# Patient Record
Sex: MALE | Employment: OTHER | URBAN - METROPOLITAN AREA
[De-identification: names, ages, dates, MRNs, and addresses within clinical notes are randomized per-mention and may not be internally consistent; named-entity substitution may affect disease eponyms.]

---

## 2017-03-23 DIAGNOSIS — J30.2 SEASONAL ALLERGIES: Primary | ICD-10-CM

## 2017-03-23 RX ORDER — CETIRIZINE HYDROCHLORIDE 10 MG/1
10 TABLET ORAL EVERY EVENING
Qty: 30 TABLET | Refills: 3 | Status: SHIPPED | OUTPATIENT
Start: 2017-03-23 | End: 2017-08-08

## 2017-03-23 NOTE — TELEPHONE ENCOUNTER
Prescription approved per [facility] Refill Protocol.

## 2017-03-23 NOTE — TELEPHONE ENCOUNTER
cetirizine zyrtec 10mg      Last Written Prescription Date: 03/22/16  Last Fill Quantity: 30,  # refills: 8   Last Office Visit with [facility], [facility] or [facility] prescribing provider: 09/21/16

## 2017-04-20 DIAGNOSIS — H69.91 ACUTE DYSFUNCTION OF EUSTACHIAN TUBE, RIGHT: ICD-10-CM

## 2017-04-21 RX ORDER — FLUTICASONE PROPIONATE 50 MCG
SPRAY, SUSPENSION (ML) NASAL
Qty: 16 ML | Refills: 1 | Status: SHIPPED | OUTPATIENT
Start: 2017-04-21 | End: 2017-04-24

## 2017-04-21 NOTE — TELEPHONE ENCOUNTER
fluticasone       Last Written Prescription Date: 10/06/16  Last Fill Quantity: 1bottle, # refills: 0    Last Office Visit with [facility], P or [facility] prescribing provider:  09/21/16   Future Office Visit:  04/24/17  Next 5 appointments (look out 90 days)     Apr 24, 2017  7:00 AM CDT   PHYSICAL with Blake Jara MD   [facility]    [address] [place] [id number] [phone]                   Date of Last Asthma Action Plan Letter:   There are no preventive care reminders to display for this patient.   Asthma Control Test: No flowsheet data found.    Date of Last Spirometry Test:   No results found for this or any previous visit.

## 2017-04-21 NOTE — TELEPHONE ENCOUNTER
Prescription approved per [facility] Refill Protocol.

## 2017-04-24 ENCOUNTER — OFFICE VISIT (OUTPATIENT)
Dept: INTERNAL MEDICINE | Facility: CLINIC | Age: 37
End: 2017-04-24
Payer: COMMERCIAL

## 2017-04-24 VITALS
HEIGHT: 68 IN | DIASTOLIC BLOOD PRESSURE: 80 MMHG | HEART RATE: 65 BPM | BODY MASS INDEX: 29.36 KG/M2 | OXYGEN SATURATION: 100 % | WEIGHT: 193.7 LBS | TEMPERATURE: 97.7 F | SYSTOLIC BLOOD PRESSURE: 120 MMHG

## 2017-04-24 DIAGNOSIS — Z00.01 ENCOUNTER FOR ROUTINE ADULT MEDICAL EXAM WITH ABNORMAL FINDINGS: Primary | ICD-10-CM

## 2017-04-24 DIAGNOSIS — E78.1 HYPERTRIGLYCERIDEMIA: ICD-10-CM

## 2017-04-24 LAB
CHOLEST SERPL-MCNC: 183 MG/DL
GLUCOSE SERPL-MCNC: 97 MG/DL (ref 70–99)
HDLC SERPL-MCNC: 32 MG/DL
LDLC SERPL CALC-MCNC: ABNORMAL MG/DL
LDLC SERPL DIRECT ASSAY-MCNC: 94 MG/DL
NONHDLC SERPL-MCNC: 151 MG/DL
TRIGL SERPL-MCNC: 443 MG/DL

## 2017-04-24 PROCEDURE — 82947 ASSAY GLUCOSE BLOOD QUANT: CPT | Performed by: INTERNAL MEDICINE

## 2017-04-24 PROCEDURE — 36415 COLL VENOUS BLD VENIPUNCTURE: CPT | Performed by: INTERNAL MEDICINE

## 2017-04-24 PROCEDURE — 83721 ASSAY OF BLOOD LIPOPROTEIN: CPT | Mod: 59 | Performed by: INTERNAL MEDICINE

## 2017-04-24 PROCEDURE — 80061 LIPID PANEL: CPT | Performed by: INTERNAL MEDICINE

## 2017-04-24 PROCEDURE — 99395 PREV VISIT EST AGE 18-39: CPT | Performed by: INTERNAL MEDICINE

## 2017-04-24 NOTE — PROGRESS NOTES
SUBJECTIVE:     CC: Cesar Zhao is an 36 year old male who presents for preventative health visit.     Healthy Habits:    Do you get at least three servings of calcium containing foods daily (dairy, green leafy vegetables, etc.)? yes    Amount of exercise or daily activities, outside of work: 2 day(s) per week    Problems taking medications regularly No    Medication side effects: No    Have you had an eye exam in the past two years? yes    Do you see a dentist twice per year? yes  Do you have sleep apnea, excessive snoring or daytime drowsiness?no    Today's PHQ-2 Score:   PHQ-2 ( 1999 Pf[employer]) 4/24/2017 3/22/2016   Q1: Little interest or pleasure in doing things 0 0   Q2: Feeling down, depressed or hopeless 0 0   PHQ-2 Score 0 0       Abuse: Current or Past(Physical, Sexual or Emotional)- No  Do you feel safe in your environment - Yes    Social History   Substance Use Topics     Smoking status: Current Every Day Smoker     Packs/day: 0.10     Types: Cigarettes     Smokeless tobacco: Never Used     Alcohol use 0.0 oz/week     0 Standard drinks or equivalent per week      Comment: 6x per year     The patient does not drink >3 drinks per day nor >7 drinks per week.    Last PSA: No results found for: PSA    Recent Labs   Lab Test  03/22/16   0959  03/16/15   0911   12/05/13   0959   CHOL  173  141   --   164   HDL  33*  30*   --   32*   LDL  Cannot estimate LDL when triglyceride exceeds 400 mg/dL  79  37   --   84   TRIG  494*  369*   < >  245*   CHOLHDLRATIO   --   4.7   --   5.2*   NHDL  140*   --    --    --     < > = values in this interval not displayed.       Reviewed orders with patient. Reviewed health maintenance and updated orders accordingly - Yes    Reviewed and updated as needed this visit by clinical staff  Tobacco  Allergies  Soc Hx        Reviewed and updated as needed this visit by Provider  Tobacco  Soc Hx           ROS:  C: NEGATIVE for fever, chills, change in weight  I: NEGATIVE  "for worrisome rashes, moles or lesions  E: NEGATIVE for vision changes or irritation  ENT: NEGATIVE for ear, mouth and throat problems  R: NEGATIVE for significant cough or SOB  CV: NEGATIVE for chest pain, palpitations or peripheral edema  GI: NEGATIVE for nausea, abdominal pain, heartburn, or change in bowel habits   male: negative for dysuria, hematuria, decreased urinary stream, erectile dysfunction, urethral discharge  M: NEGATIVE for significant arthralgias or myalgia  N: NEGATIVE for weakness, dizziness or paresthesias  P: NEGATIVE for changes in mood or affect    Problem list, Medication list, Allergies, and Medical/Social/Surgical histories reviewed in [facility] and updated as appropriate.  OBJECTIVE:     /80  Pulse 65  Temp 97.7  F (36.5  C) (Oral)  Ht 5' 8\" (1.727 m)  Wt 193 lb 11.2 oz (87.9 kg)  SpO2 100%  BMI 29.45 kg/m2  EXAM:  GENERAL: healthy, alert and no distress  EYES: Eyes grossly normal to inspection, PERRL and conjunctivae and sclerae normal  HENT: ear canals and TM's normal, nose and mouth without ulcers or lesions  NECK: no adenopathy, no asymmetry, masses, or scars and thyroid normal to palpation  RESP: lungs clear to auscultation - no rales, rhonchi or wheezes  CV: regular rate and rhythm, normal S1 S2, no S3 or S4, no murmur, click or rub, no peripheral edema and peripheral pulses strong  ABDOMEN: soft, nontender, no hepatosplenomegaly, no masses and bowel sounds normal  MS: no gross musculoskeletal defects noted, no edema  SKIN: no suspicious lesions or rashes  NEURO: Normal strength and tone, mentation intact and speech normal  PSYCH: mentation appears normal, affect normal/bright  LYMPH: no cervical, supraclavicular, axillary, or inguinal adenopathy    Results for orders placed or performed in visit on 04/24/17   Lipid Profile with reflex to direct LDL   Result Value Ref Range    Cholesterol 183 <200 mg/dL    Triglycerides 443 (H) <150 mg/dL    HDL Cholesterol 32 (L) >39 mg/dL " "   LDL Cholesterol Calculated  <100 mg/dL     Cannot estimate LDL when triglyceride exceeds 400 mg/dL    Non HDL Cholesterol 151 (H) <130 mg/dL   Glucose   Result Value Ref Range    Glucose 97 70 - 99 mg/dL   LDL cholesterol direct   Result Value Ref Range    LDL Cholesterol Direct 94 <100 mg/dL      ASSESSMENT/PLAN:     1. Encounter for routine adult medical exam with abnormal findings  Overall in good health - follow dietary recommendations , exercise given elev TG      2. Hypertriglyceridemia, initial TG>1000  Continues- will have him try some omega fatty acids/Fish oil OTC  - Lipid Profile with reflex to direct LDL  - Glucose    COUNSELING:  Reviewed preventive health counseling, as reflected in patient instructions         reports that he has been smoking Cigarettes.  He has been smoking about 0.10 packs per day. He has never used smokeless tobacco.    Estimated body mass index is 29.45 kg/(m^2) as calculated from the following:    Height as of this encounter: 5' 8\" (1.727 m).    Weight as of this encounter: 193 lb 11.2 oz (87.9 kg).       Counseling Resources:  ATP IV Guidelines  Pooled Cohorts Equation Calculator  FRAX Risk Assessment  ICSI Preventive Guidelines  Dietary Guidelines for Americans, 2010  USDA's MyPlate  ASA Prophylaxis  Lung CA Screening    Blake Jara MD  [facility]  "

## 2017-04-24 NOTE — MR AVS SNAPSHOT
After Visit Summary   4/24/2017    Cesar Zhao    MRN: [id number]           Patient Information     Date Of Birth          1980        Visit Information        Provider Department      4/24/2017 7:00 AM Blake Jara MD [facility]        Today's Diagnoses     Encounter for routine adult medical exam with abnormal findings    -  1    Hypertriglyceridemia, initial TG>1000          Care Instructions      Preventive Health Recommendations  Male Ages 26 - 39    Yearly exam:             See your health care provider every year in order to  o   Review health changes.   o   Discuss preventive care.    o   Review your medicines if your doctor has prescribed any.    You should be tested each year for STDs (sexually transmitted diseases), if you re at risk.     After age 35, talk to your provider about cholesterol testing. If you are at risk for heart disease, have your cholesterol tested at least every 5 years.     If you are at risk for diabetes, you should have a diabetes test (fasting glucose).  Shots: Get a flu shot each year. Get a tetanus shot every 10 years.     Nutrition:    Eat at least 5 servings of fruits and vegetables daily.     Eat whole-grain bread, whole-wheat pasta and brown rice instead of white grains and rice.     Talk to your provider about Calcium and Vitamin D.     Lifestyle    Exercise for at least 150 minutes a week (30 minutes a day, 5 days a week). This will help you control your weight and prevent disease.     Limit alcohol to one drink per day.     No smoking.     Wear sunscreen to prevent skin cancer.     See your dentist every six months for an exam and cleaning.     The heart-healthy Mediterranean is a healthy eating plan based on typical foods and recipes of Mediterranean-style cooking. Here's how to adopt the Mediterranean diet.   If you're looking for a heart-healthy eating plan, the Mediterranean diet might be right for you. The  Mediterranean diet incorporates the basics of healthy eating -- plus a splash of flavorful olive oil and perhaps even a glass of red wine -- among other components characterizing the traditional cooking style of countries bordering the [place].  Most healthy diets include fruits, vegetables, fish and whole grains, and limit unhealthy fats. While these parts of a healthy diet remain tried-and-true, subtle variations or differences in proportions of certain foods may make a difference in your risk of heart disease.  Research has shown that the traditional Mediterranean diet reduces the risk of heart disease. In fact, an analysis of more than 1.5 million healthy adults demonstrated that following a Mediterranean diet was associated with a reduced risk of death from heart disease and cancer, as well as a reduced incidence of Parkinson's and Alzheimer's diseases.   The Dietary Guidelines for Americans recommends the Mediterranean diet as an eating plan that can help promote health and prevent disease. And the Mediterranean diet is one your whole family can follow for good health.   The Mediterranean diet emphasizes:  Eating primarily plant-based foods, such as fruits and vegetables, whole grains, legumes and nuts   Replacing butter with healthy fats, such as olive oil   Using herbs and spices instead of salt to flavor foods   Limiting red meat to no more than a few times a month   Eating fish and poultry at least twice a week   Drinking red wine in moderation (optional)  The diet also recognizes the importance of being physically active, and enjoying meals with family and friends.  The Mediterranean diet traditionally includes fruits, vegetables and grains. For example, residents of [facility] average six or more servings a day of antioxidant-rich fruits and vegetables.  Grains in the Mediterranean region are typically whole grain and usually contain very few unhealthy trans fats, and bread is an important part of  "the diet. However, throughout the Mediterranean region, bread is eaten plain or dipped in olive oil -- not eaten with butter or margarine, which contains saturated or trans fats.   Nuts are another part of a healthy Mediterranean diet. Nuts are high in fat, but most of the fat is healthy. Because nuts are high in calories, they should not be eaten in large amounts -- generally no more than a handful a day. For the best nutrition, avoid candied or honey-roasted and heavily salted nuts.  The focus of the Mediterranean diet isn't on limiting total fat consumption, but rather on choosing healthier types of fat. The Mediterranean diet discourages saturated fats and hydrogenated oils (trans fats), both of which contribute to heart disease.  The Mediterranean diet features olive oil as the primary source of fat. Olive oil is mainly monounsaturated fat -- a type of fat that can help reduce low-density lipoprotein (LDL) cholesterol levels when used in place of saturated or trans fats. \"Extra-virgin\" and \"virgin\" olive oils (the least processed forms) also contain the highest levels of protective plant compounds that provide antioxidant effects.  Canola oil and some nuts contain the beneficial linolenic acid (a type of omega-3 fatty acid) in addition to healthy unsaturated fat. Omega-3 fatty acids lower triglycerides, decrease blood clotting, and are associated with decreased incidence of sudden heart attacks, improve the health of your blood vessels, and help moderate blood pressure. Fatty fish -- such as mackerel, lake trout, herring, sardines, albacore tuna and salmon -- are rich sources of omega-3 fatty acids. Fish is eaten on a regular basis in the Mediterranean diet.  The health effects of alcohol have been debated for many years, and some doctors are reluctant to encourage alcohol consumption because of the health consequences of excessive drinking. However, alcohol -- in moderation -- has been associated with a " reduced risk of heart disease in some research studies.  The Mediterranean diet typically includes a moderate amount of wine, usually red wine. This means no more than 5 ounces (148 milliliters) of wine daily for women of all ages and men older than age 65 and no more than 10 ounces (296 milliliters) of wine daily for younger men. More than this may increase the risk of health problems, including increased risk of certain types of cancer.   If you're unable to limit your alcohol intake to the amounts defined above, if you have a personal or family history of alcohol abuse, or if you have heart or liver disease, refrain from drinking wine or any other alcohol.  The Mediterranean diet is a delicious and healthy way to eat. Many people who switch to this style of eating say they'll never eat any other way. Here are some specific steps to get you started:   Eat your veggies and fruits -- and switch to whole grains. [place] of plant foods should make up the majority of your meals. They should be minimally processed -- fresh and whole are best. Include veggies and fruits in every meal and eat them for snacks as well. Switch to whole-grain bread and cereal, and begin to eat more whole-grain rice and pasta products. Keep baby carrots, apples and bananas on hand for quick, satisfying snacks. Fruit salads are a wonderful way to eat a variety of healthy fruit.   Go nuts. Nuts and seeds are good sources of fiber, protein and healthy fats. Keep almonds, cashews, pistachios and walnuts on hand for a quick snack. Choose natural peanut butter, rather than the kind with hydrogenated fat added. Try blended sesame seeds (tahini) as a dip or spread for bread.   Pass on the butter. Try olive or canola oil as a healthy replacement for butter or margarine. Lightly drizzle it over vegetables. After cooking pasta, add a touch of olive oil, some garlic and green onions for flavoring. Dip bread in flavored olive oil or lightly spread it on  whole-grain bread for a tasty alternative to butter. Try tahini as a dip or spread for bread too.   Spice it up. Herbs and spices make food tasty and can  for salt and fat in recipes.   Go fish. Eat fish at least twice a week. Fresh or water-packed tuna, salmon, trout, mackerel and herring are healthy choices. [place], bake or broil fish for great taste and easy cleanup. Avoid breaded and fried fish.   Rein in the red meat. Limit red meat to no more than a few times a month. Substitute fish and poultry for red meat. When choosing red meat, make sure it's lean and keep portions small (about the size of a deck of cards). Also avoid sausage, goldstein and other high-fat, processed meats.   Choose low-fat dairy. Limit higher fat dairy products, such as whole or 2 percent milk, cheese and ice cream. Switch to skim milk, fat-free yogurt and low-fat cheese          Follow-ups after your visit        Who to contact     If you have questions or need follow up information about today's clinic visit or your schedule please contact [facility] directly at [phone].  Normal or non-critical lab and imaging results will be communicated to you by Diseniahart, letter or phone within 4 business days after the clinic has received the results. If you do not hear from us within 7 days, please contact the clinic through SenSaget or phone. If you have a critical or abnormal lab result, we will notify you by phone as soon as possible.  Submit refill requests through InVisioneer or call your pharmacy and they will forward the refill request to us. Please allow 3 business days for your refill to be completed.          Additional Information About Your Visit        InVisioneer Information     In[employer] gives you secure access to your electronic health record. If you see a primary care provider, you can also send messages to your care team and make appointments. If you have questions, please call your primary care clinic.  If  "you do not have a primary care provider, please call [phone] and they will assist you.        Care EveryWhere ID     This is your Care EveryWhere ID. This could be used by other organizations to access your [facility] medical records  [id number]        Your Vitals Were     Pulse Temperature Height Pulse Oximetry BMI (Body Mass Index)       65 97.7  F (36.5  C) (Oral) 5' 8\" (1.727 m) 100% 29.45 kg/m2        Blood Pressure from Last 3 Encounters:   04/24/17 120/80   11/04/16 122/70   10/06/16 112/70    Weight from Last 3 Encounters:   04/24/17 193 lb 11.2 oz (87.9 kg)   11/04/16 193 lb 4.8 oz (87.7 kg)   10/06/16 193 lb 14.4 oz (88 kg)              We Performed the Following     Glucose     Lipid Profile with reflex to direct LDL        Primary Care Provider Office Phone # Fax #    Blake Jara [phone]       [facility] [address] [place] [address]        Thank you!     Thank you for choosing [facility]  for your care. Our goal is always to provide you with excellent care. Hearing back from our patients is one way we can continue to improve our services. Please take a few minutes to complete the written survey that you may receive in the mail after your visit with us. Thank you!             Your Updated Medication List - Protect others around you: Learn how to safely use, store and throw away your medicines at www.disposemymeds.org.          This list is accurate as of: 4/24/17  7:44 AM.  Always use your most recent med list.                   Brand Name Dispense Instructions for use    cetirizine 10 MG tablet    zyrTEC    30 tablet    Take 1 tablet (10 mg) by mouth every evening During allergy season       fluticasone 50 MCG/ACT spray    FLONASE    16 g    Spray 2 sprays into both nostrils daily USE ONCE PER DAY DURING THE ALLERGY SEASON       terbinafine 250 MG tablet    lamISIL     Take 250 mg by mouth daily Reported on 4/24/2017       " valACYclovir 500 MG tablet    VALTREX    10 tablet    Take 1 tablet (500 mg) by mouth 2 times daily

## 2017-04-24 NOTE — PATIENT INSTRUCTIONS
Preventive Health Recommendations  Male Ages 26 - 39    Yearly exam:             See your health care provider every year in order to  o   Review health changes.   o   Discuss preventive care.    o   Review your medicines if your doctor has prescribed any.    You should be tested each year for STDs (sexually transmitted diseases), if you re at risk.     After age 35, talk to your provider about cholesterol testing. If you are at risk for heart disease, have your cholesterol tested at least every 5 years.     If you are at risk for diabetes, you should have a diabetes test (fasting glucose).  Shots: Get a flu shot each year. Get a tetanus shot every 10 years.     Nutrition:    Eat at least 5 servings of fruits and vegetables daily.     Eat whole-grain bread, whole-wheat pasta and brown rice instead of white grains and rice.     Talk to your provider about Calcium and Vitamin D.     Lifestyle    Exercise for at least 150 minutes a week (30 minutes a day, 5 days a week). This will help you control your weight and prevent disease.     Limit alcohol to one drink per day.     No smoking.     Wear sunscreen to prevent skin cancer.     See your dentist every six months for an exam and cleaning.     The heart-healthy Mediterranean is a healthy eating plan based on typical foods and recipes of Mediterranean-style cooking. Here's how to adopt the Mediterranean diet.   If you're looking for a heart-healthy eating plan, the Mediterranean diet might be right for you. The Mediterranean diet incorporates the basics of healthy eating -- plus a splash of flavorful olive oil and perhaps even a glass of red wine -- among other components characterizing the traditional cooking style of countries bordering the [place].  Most healthy diets include fruits, vegetables, fish and whole grains, and limit unhealthy fats. While these parts of a healthy diet remain tried-and-true, subtle variations or differences in proportions of  certain foods may make a difference in your risk of heart disease.  Research has shown that the traditional Mediterranean diet reduces the risk of heart disease. In fact, an analysis of more than 1.5 million healthy adults demonstrated that following a Mediterranean diet was associated with a reduced risk of death from heart disease and cancer, as well as a reduced incidence of Parkinson's and Alzheimer's diseases.   The Dietary Guidelines for Americans recommends the Mediterranean diet as an eating plan that can help promote health and prevent disease. And the Mediterranean diet is one your whole family can follow for good health.   The Mediterranean diet emphasizes:  Eating primarily plant-based foods, such as fruits and vegetables, whole grains, legumes and nuts   Replacing butter with healthy fats, such as olive oil   Using herbs and spices instead of salt to flavor foods   Limiting red meat to no more than a few times a month   Eating fish and poultry at least twice a week   Drinking red wine in moderation (optional)  The diet also recognizes the importance of being physically active, and enjoying meals with family and friends.  The Mediterranean diet traditionally includes fruits, vegetables and grains. For example, residents of [facility] average six or more servings a day of antioxidant-rich fruits and vegetables.  Grains in the Mediterranean region are typically whole grain and usually contain very few unhealthy trans fats, and bread is an important part of the diet. However, throughout the Mediterranean region, bread is eaten plain or dipped in olive oil -- not eaten with butter or margarine, which contains saturated or trans fats.   Nuts are another part of a healthy Mediterranean diet. Nuts are high in fat, but most of the fat is healthy. Because nuts are high in calories, they should not be eaten in large amounts -- generally no more than a handful a day. For the best nutrition, avoid candied or  "honey-roasted and heavily salted nuts.  The focus of the Mediterranean diet isn't on limiting total fat consumption, but rather on choosing healthier types of fat. The Mediterranean diet discourages saturated fats and hydrogenated oils (trans fats), both of which contribute to heart disease.  The Mediterranean diet features olive oil as the primary source of fat. Olive oil is mainly monounsaturated fat -- a type of fat that can help reduce low-density lipoprotein (LDL) cholesterol levels when used in place of saturated or trans fats. \"Extra-virgin\" and \"virgin\" olive oils (the least processed forms) also contain the highest levels of protective plant compounds that provide antioxidant effects.  Canola oil and some nuts contain the beneficial linolenic acid (a type of omega-3 fatty acid) in addition to healthy unsaturated fat. Omega-3 fatty acids lower triglycerides, decrease blood clotting, and are associated with decreased incidence of sudden heart attacks, improve the health of your blood vessels, and help moderate blood pressure. Fatty fish -- such as mackerel, lake trout, herring, sardines, albacore tuna and salmon -- are rich sources of omega-3 fatty acids. Fish is eaten on a regular basis in the Mediterranean diet.  The health effects of alcohol have been debated for many years, and some doctors are reluctant to encourage alcohol consumption because of the health consequences of excessive drinking. However, alcohol -- in moderation -- has been associated with a reduced risk of heart disease in some research studies.  The Mediterranean diet typically includes a moderate amount of wine, usually red wine. This means no more than 5 ounces (148 milliliters) of wine daily for women of all ages and men older than age 65 and no more than 10 ounces (296 milliliters) of wine daily for younger men. More than this may increase the risk of health problems, including increased risk of certain types of cancer.   If you're " unable to limit your alcohol intake to the amounts defined above, if you have a personal or family history of alcohol abuse, or if you have heart or liver disease, refrain from drinking wine or any other alcohol.  The Mediterranean diet is a delicious and healthy way to eat. Many people who switch to this style of eating say they'll never eat any other way. Here are some specific steps to get you started:   Eat your veggies and fruits -- and switch to whole grains. Av[place]ety of plant foods should make up the majority of your meals. They should be minimally processed -- fresh and whole are best. Include veggies and fruits in every meal and eat them for snacks as well. Switch to whole-grain bread and cereal, and begin to eat more whole-grain rice and pasta products. Keep baby carrots, apples and bananas on hand for quick, satisfying snacks. Fruit salads are a wonderful way to eat a variety of healthy fruit.   Go nuts. Nuts and seeds are good sources of fiber, protein and healthy fats. Keep almonds, cashews, pistachios and walnuts on hand for a quick snack. Choose natural peanut butter, rather than the kind with hydrogenated fat added. Try blended sesame seeds (tahini) as a dip or spread for bread.   Pass on the butter. Try olive or canola oil as a healthy replacement for butter or margarine. Lightly drizzle it over vegetables. After cooking pasta, add a touch of olive oil, some garlic and green onions for flavoring. Dip bread in flavored olive oil or lightly spread it on whole-grain bread for a tasty alternative to butter. Try tahini as a dip or spread for bread too.   Spice it up. Herbs and spices make food tasty and can  for salt and fat in recipes.   Go fish. Eat fish at least twice a week. Fresh or water-packed tuna, salmon, trout, mackerel and herring are healthy choices. [place], bake or broil fish for great taste and easy cleanup. Avoid breaded and fried fish.   Rein in the red meat. Limit red meat to no  more than a few times a month. Substitute fish and poultry for red meat. When choosing red meat, make sure it's lean and keep portions small (about the size of a deck of cards). Also avoid sausage, goldstein and other high-fat, processed meats.   Choose low-fat dairy. Limit higher fat dairy products, such as whole or 2 percent milk, cheese and ice cream. Switch to skim milk, fat-free yogurt and low-fat cheese

## 2017-04-24 NOTE — NURSING NOTE
"Chief Complaint   Patient presents with     Physical       Initial /80  Pulse 65  Temp 97.7  F (36.5  C) (Oral)  Ht 5' 8\" (1.727 m)  Wt 193 lb 11.2 oz (87.9 kg)  SpO2 100%  BMI 29.45 kg/m2 Estimated body mass index is 29.45 kg/(m^2) as calculated from the following:    Height as of this encounter: 5' 8\" (1.727 m).    Weight as of this encounter: 193 lb 11.2 oz (87.9 kg).  Medication Reconciliation: complete    "

## 2017-07-25 ENCOUNTER — OFFICE VISIT (OUTPATIENT)
Dept: INTERNAL MEDICINE | Facility: CLINIC | Age: 37
End: 2017-07-25
Payer: COMMERCIAL

## 2017-07-25 VITALS
BODY MASS INDEX: 28.8 KG/M2 | HEART RATE: 62 BPM | DIASTOLIC BLOOD PRESSURE: 66 MMHG | WEIGHT: 189.4 LBS | OXYGEN SATURATION: 97 % | SYSTOLIC BLOOD PRESSURE: 124 MMHG | TEMPERATURE: 98.2 F

## 2017-07-25 DIAGNOSIS — N45.1 EPIDIDYMITIS, RIGHT: Primary | ICD-10-CM

## 2017-07-25 PROCEDURE — 99213 OFFICE O/P EST LOW 20 MIN: CPT | Performed by: INTERNAL MEDICINE

## 2017-07-25 RX ORDER — LEVOFLOXACIN 500 MG/1
500 TABLET, FILM COATED ORAL DAILY
Qty: 10 TABLET | Refills: 0 | Status: SHIPPED | OUTPATIENT
Start: 2017-07-25 | End: 2017-08-04

## 2017-07-25 NOTE — MR AVS SNAPSHOT
After Visit Summary   7/25/2017    Cesar Zhao    MRN: [id number]           Patient Information     Date Of Birth          1980        Visit Information        Provider Department      7/25/2017 3:20 PM Tristan Herrera MD [facility]        Today's Diagnoses     Epididymitis, right    -  1      Care Instructions    You have a bacterial infection of the Epididymis.  his condition will produce lower abdominal and/or pelvic pain, low back pain, urinary frequency, urinary urgency, small amount of blood in the urine or semen.      The most common antibiotic we use is called Levofloxacin 500 mg once per day for 10 days.  This will work most of the time.     The most common side effects from Levofloxacin include diarrhea, stomach upset, and possible tendonitis or rear cases of tendon rupture.  If you develop any side effects, stop the medication and call our clinic nurse line at [phone].        *  take over the counter Motrin/Ibuprofen/Advil or Aleve to help relieve pain and inflammation.  follow the directions on the bottle.  Be sure to take with a small meal to prevent stomach upset.      --Motrin 600 mg ( 3 x 200 mg tablets) three times per day every day for 5-7 days, then 2-3 timers per day as needed (take with food to avoid stomach side effects)     OR     --Aleve 2 tablets twice per day for 5-7 days every day, then twice per day as needed *  moist heat as needed ( do not use a heating pad for longer than 20 minutes to lower the risk of burns)            Follow-ups after your visit        Who to contact     If you have questions or need follow up information about today's clinic visit or your schedule please contact [facility] directly at [phone].  Normal or non-critical lab and imaging results will be communicated to you by MyChart, letter or phone within 4 business days after the clinic has received the results. If  you do not hear from us within 7 days, please contact the clinic through [employer] or phone. If you have a critical or abnormal lab result, we will notify you by phone as soon as possible.  Submit refill requests through [employer] or call your pharmacy and they will forward the refill request to us. Please allow 3 business days for your refill to be completed.          Additional Information About Your Visit        [employer] Information     [employer] gives you secure access to your electronic health record. If you see a primary care provider, you can also send messages to your care team and make appointments. If you have questions, please call your primary care clinic.  If you do not have a primary care provider, please call [phone] and they will assist you.        Care EveryWhere ID     This is your Care EveryWhere ID. This could be used by other organizations to access your [facility] medical records  [id number]        Your Vitals Were     Pulse Temperature Pulse Oximetry BMI (Body Mass Index)          62 98.2  F (36.8  C) (Oral) 97% 28.8 kg/m2         Blood Pressure from Last 3 Encounters:   07/25/17 124/66   04/24/17 120/80   11/04/16 122/70    Weight from Last 3 Encounters:   07/25/17 189 lb 6.4 oz (85.9 kg)   04/24/17 193 lb 11.2 oz (87.9 kg)   11/04/16 193 lb 4.8 oz (87.7 kg)              Today, you had the following     No orders found for display         Today's Medication Changes          These changes are accurate as of: 7/25/17  4:18 PM.  If you have any questions, ask your nurse or doctor.               Start taking these medicines.        Dose/Directions    levofloxacin 500 MG tablet   Commonly known as:  LEVAQUIN   Used for:  Epididymitis, right   Started by:  Tristan Herrera MD        Dose:  500 mg   Take 1 tablet (500 mg) by mouth daily for 10 days   Quantity:  10 tablet   Refills:  0            Where to get your medicines      These medications were sent to [facility] [id number] [place], [place]  - [address]  [address], [facility] [address]     Phone:  [phone]     levofloxacin 500 MG tablet                Primary Care Provider Office Phone # Fax #    Blake Jara [phone]       [facility] [address]  [facility] [address]        Equal Access to Services     JOSH LEUNG : Hadii aad ku hadasho Soomaali, waaxda luqadaha, qaybta kaalmada adeegyada, waxay idiin hayaan adeeg kharash la'aan ah. So [facility] [phone].    ATENCIÓN: Si habla espjovana, tiene a perez disposición servicios gratuitos de asistencia lingüística. Ileana al [phone].    We comply with applicable federal civil rights laws and [place] laws. We do not discriminate on the basis of race, color, national origin, age, disability sex, sexual orientation or gender identity.            Thank you!     Thank you for choosing [facility]  for your care. Our goal is always to provide you with excellent care. Hearing back from our patients is one way we can continue to improve our services. Please take a few minutes to complete the written survey that you may receive in the mail after your visit with us. Thank you!             Your Updated Medication List - Protect others around you: Learn how to safely use, store and throw away your medicines at www.disposemymeds.org.          This list is accurate as of: 7/25/17  4:18 PM.  Always use your most recent med list.                   Brand Name Dispense Instructions for use Diagnosis    cetirizine 10 MG tablet    zyrTEC    30 tablet    Take 1 tablet (10 mg) by mouth every evening During allergy season    Seasonal allergies       fluticasone 50 MCG/ACT spray    FLONASE    16 g    Spray 2 sprays into both nostrils daily USE ONCE PER DAY DURING THE ALLERGY SEASON    Allergic rhinitis due to pollen       levofloxacin 500 MG tablet    LEVAQUIN    10 tablet    Take 1 tablet (500 mg) by mouth daily for 10 days    Epididymitis,  right

## 2017-07-25 NOTE — PROGRESS NOTES
SUBJECTIVE:                                                    Cesar Zhao is a 36 year old male who presents to clinic today for the following health issues:      ABDOMINAL   PAIN     Onset: 4 days    Description:   Character: Dull ache  Location: right lower quadrant and R testicle  Radiation: None    Intensity: moderate    Progression of Symptoms:  same    Accompanying Signs & Symptoms:  Fever/Chills?: no   Gas/Bloating: YES  Nausea: YES  Vomitting: no   Diarrhea?: no   Constipation:no   Dysuria or Hematuria: no     Pain is worse when pt is active/moving around   History:   Trauma: no   Previous similar pain: YES- same pain occurred about 12 yrs ago, they said it was due to activity in sport and was told to wear more form fitting underwear   Previous tests done: none    Precipitating factors:   Does the pain change with:     Food: no      BM: no     Urination: no     Alleviating factors:  inactivity    Therapies Tried and outcome: nothing    LMP:  not applicable             Problem list and histories reviewed & adjusted, as indicated.  Additional history: as documented        Reviewed and updated as needed this visit by clinical staffTobacco  Allergies       Reviewed and updated as needed this visit by Provider           Past Medical History:  ---------------------------  Past Medical History:   Diagnosis Date     Hyperlipidemia LDL goal < 130     elevated triglycerides     Hypertriglyceridemia        Past Surgical History:  ---------------------------  No past surgical history on file.    Current Medications:  ---------------------------  Current Outpatient Prescriptions   Medication Sig Dispense Refill     cetirizine (ZYRTEC) 10 MG tablet Take 1 tablet (10 mg) by mouth every evening During allergy season 30 tablet 3     fluticasone (FLONASE) 50 MCG/ACT nasal spray Spray 2 sprays into both nostrils daily USE ONCE PER DAY DURING THE ALLERGY SEASON 16 g 11       Allergies:  -------------  Allergies    Allergen Reactions     No Known Drug Allergies        Social History:  -------------------  Social History     Social History     Marital status:      Spouse name: N/A     Number of children: 1     Years of education: N/A     Occupational History     landscaping Self     Social History Main Topics     Smoking status: Current Every Day Smoker     Packs/day: 0.10     Types: Cigarettes     Smokeless tobacco: Never Used     Alcohol use 0.0 oz/week     0 Standard drinks or equivalent per week      Comment: 6x per year     Drug use: No     Sexual activity: Yes     Partners: Female     Other Topics Concern     Not on file     Social History Narrative       Family Medical History:  ------------------------------  Family History   Problem Relation Age of Onset     Hypertension Mother      Asthma Mother      resolved     Allergies Mother      seasonal allergies         ROS:  REVIEW OF SYSTEMS:    RESP: negative for cough, dyspnea, wheezing, hemoptysis  CV: negative for chest pain, palpitations, PND, CHAHAL, orthopnea; reports no changes in their ability to perform physical activity (from cardiovascular standpoint)  GI: negative for dysphagia, N/V, pain, melena, diarrhea and constipation  NEURO: negative for numbness/tingling, paralysis, incoordination, or focal weakness     OBJECTIVE:                                                    /66  Pulse 62  Temp 98.2  F (36.8  C) (Oral)  Wt 189 lb 6.4 oz (85.9 kg)  SpO2 97%  BMI 28.8 kg/m2     GENERAL alert and no distress  EYES:  Normal sclera,conjunctiva, EOMI  HENT: oral and posterior pharynx without lesions or erythema, facies symmetric  NECK: Neck supple. No LAD, without thyroidmegaly or JVD., Carotids without bruits.  RESP: Clear to ausculation bilaterally without wheezes or crackles. Normal BS in all fields.  CV: RRR normal S1S2 without murmurs, rubs or gallops. PMI normal  LYMPH: no cervical lymph adenopathy appreciated  MS: extremities- no gross deformities  of the visible extremities noted, no edema  PSYCH: Alert and oriented times 3; speech- coherent  SKIN:  No obvious significant skin lesions on visible portions of face   :  discomfrot to direct palpatio of the right epididymis, reproduces his pain nearly exactly.   Testicles were not painful, negative Prenz sign         ASSESSMENT/PLAN:                                                        (N45.1) Epididymitis, right  (primary encounter diagnosis)  Comment:   Plan: levofloxacin (LEVAQUIN) 500 MG tablet               You have a bacterial infection of the Epididymis.  his condition will produce lower abdominal and/or pelvic pain, low back pain, urinary frequency, urinary urgency, small amount of blood in the urine or semen.      The most common antibiotic we use is called Levofloxacin 500 mg once per day for 10 days.  This will work most of the time.     The most common side effects from Levofloxacin include diarrhea, stomach upset, and possible tendonitis or rear cases of tendon rupture.  If you develop any side effects, stop the medication and call our clinic nurse line at [phone].        *  take over the counter Motrin/Ibuprofen/Advil or Aleve to help relieve pain and inflammation.  follow the directions on the bottle.  Be sure to take with a small meal to prevent stomach upset.      --Motrin 600 mg ( 3 x 200 mg tablets) three times per day every day for 5-7 days, then 2-3 timers per day as needed (take with food to avoid stomach side effects)     OR     --Aleve 2 tablets twice per day for 5-7 days every day, then twice per day as needed *  moist heat as needed ( do not use a heating pad for longer than 20 minutes to lower the risk of burns)      See Patient Instructions    CASSANDRA ALEJANDRA M.D., MD  [facility]

## 2017-07-25 NOTE — PATIENT INSTRUCTIONS
You have a bacterial infection of the Epididymis.  his condition will produce lower abdominal and/or pelvic pain, low back pain, urinary frequency, urinary urgency, small amount of blood in the urine or semen.      The most common antibiotic we use is called Levofloxacin 500 mg once per day for 10 days.  This will work most of the time.     The most common side effects from Levofloxacin include diarrhea, stomach upset, and possible tendonitis or rear cases of tendon rupture.  If you develop any side effects, stop the medication and call our clinic nurse line at [phone].        *  take over the counter Motrin/Ibuprofen/Advil or Aleve to help relieve pain and inflammation.  follow the directions on the bottle.  Be sure to take with a small meal to prevent stomach upset.      --Motrin 600 mg ( 3 x 200 mg tablets) three times per day every day for 5-7 days, then 2-3 timers per day as needed (take with food to avoid stomach side effects)     OR     --Aleve 2 tablets twice per day for 5-7 days every day, then twice per day as needed *  moist heat as needed ( do not use a heating pad for longer than 20 minutes to lower the risk of burns)

## 2017-07-25 NOTE — NURSING NOTE
"Chief Complaint   Patient presents with     Abdominal Pain     pain in R groin/abd X 4 days       Initial /66  Pulse 62  Temp 98.2  F (36.8  C) (Oral)  Wt 189 lb 6.4 oz (85.9 kg)  SpO2 97%  BMI 28.8 kg/m2 Estimated body mass index is 28.8 kg/(m^2) as calculated from the following:    Height as of 4/24/17: 5' 8\" (1.727 m).    Weight as of this encounter: 189 lb 6.4 oz (85.9 kg).  Medication Reconciliation: complete   Cary Samano CMA      "

## 2017-08-08 DIAGNOSIS — J30.2 SEASONAL ALLERGIES: ICD-10-CM

## 2017-08-08 RX ORDER — CETIRIZINE HYDROCHLORIDE 10 MG/1
TABLET ORAL
Qty: 30 TABLET | Refills: 1 | Status: SHIPPED | OUTPATIENT
Start: 2017-08-08 | End: 2018-09-25

## 2017-08-08 NOTE — TELEPHONE ENCOUNTER
Prescription approved per [facility] Refill Protocol.      cetirizine (ZYRTEC) 10 MG tablet      Last Written Prescription Date: 3/23/17  Last Fill Quantity: 30,  # refills: 3   Last Office Visit with [facility], [facility] or [facility] prescribing provider: 7/25/17

## 2017-09-01 DIAGNOSIS — H69.91 ACUTE DYSFUNCTION OF EUSTACHIAN TUBE, RIGHT: ICD-10-CM

## 2017-09-01 DIAGNOSIS — J30.2 SEASONAL ALLERGIES: ICD-10-CM

## 2017-09-03 NOTE — TELEPHONE ENCOUNTER
cetirizine (ZYRTEC) 10 MG tablet      Last Written Prescription Date: 8/8/17  Last Fill Quantity: 30,  # refills: 1   [place] Office Visit with [facility] or [facility] prescribing provider: 7/25/17                                               fluticasone (FLONASE) 50 MCG/ACT nasal spray      Last Written Prescription Date: 3/22/16  Last Fill Quantity: 16G,  # refills: 11   Last [place] Visit [place] [facility] or [facility] prescribing provider: 7/25/17

## 2017-09-05 RX ORDER — FLUTICASONE PROPIONATE 50 MCG
SPRAY, SUSPENSION (ML) NASAL
Qty: 16 ML | Refills: 1 | Status: SHIPPED | OUTPATIENT
Start: 2017-09-05 | End: 2017-09-29

## 2017-09-05 RX ORDER — CETIRIZINE HYDROCHLORIDE 10 MG/1
TABLET ORAL
Qty: 30 TABLET | Refills: 1 | Status: SHIPPED | OUTPATIENT
Start: 2017-09-05 | End: 2017-09-29

## 2017-09-29 ENCOUNTER — OFFICE VISIT (OUTPATIENT)
Dept: INTERNAL MEDICINE | Facility: CLINIC | Age: 37
End: 2017-09-29
Payer: COMMERCIAL

## 2017-09-29 VITALS
WEIGHT: 195.2 LBS | TEMPERATURE: 98.7 F | HEART RATE: 67 BPM | BODY MASS INDEX: 29.68 KG/M2 | DIASTOLIC BLOOD PRESSURE: 76 MMHG | OXYGEN SATURATION: 96 % | SYSTOLIC BLOOD PRESSURE: 112 MMHG

## 2017-09-29 DIAGNOSIS — R42 LIGHTHEADEDNESS: Primary | ICD-10-CM

## 2017-09-29 DIAGNOSIS — F43.9 STRESS: ICD-10-CM

## 2017-09-29 PROCEDURE — 99213 OFFICE O/P EST LOW 20 MIN: CPT | Performed by: INTERNAL MEDICINE

## 2017-09-29 RX ORDER — CETIRIZINE HYDROCHLORIDE 10 MG/1
TABLET ORAL
Qty: 30 TABLET | Refills: 1 | Status: CANCELLED | OUTPATIENT
Start: 2017-09-29
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
## 2017-09-29 NOTE — NURSING NOTE
"Chief Complaint   Patient presents with     Dizziness       Initial /76  Pulse 67  Temp 98.7  F (37.1  C) (Oral)  Wt 195 lb 3.2 oz (88.5 kg)  SpO2 96%  BMI 29.68 kg/m2 Estimated body mass index is 29.68 kg/(m^2) as calculated from the following:    Height as of 4/24/17: 5' 8\" (1.727 m).    Weight as of this encounter: 195 lb 3.2 oz (88.5 kg).  Medication Reconciliation: complete    "

## 2017-09-29 NOTE — PATIENT INSTRUCTIONS
The heart-healthy Mediterranean is a healthy eating plan based on typical foods and recipes of Mediterranean-style cooking. Here's how to adopt the Mediterranean diet.   If you're looking for a heart-healthy eating plan, the Mediterranean diet might be right for you. The Mediterranean diet incorporates the basics of healthy eating -- plus a splash of flavorful olive oil and perhaps even a glass of red wine -- among other components characterizing the traditional cooking style of countries bordering the [place].  Most healthy diets include fruits, vegetables, fish and whole grains, and limit unhealthy fats. While these parts of a healthy diet remain tried-and-true, subtle variations or differences in proportions of certain foods may make a difference in your risk of heart disease.  Research has shown that the traditional Mediterranean diet reduces the risk of heart disease. In fact, an analysis of more than 1.5 million healthy adults demonstrated that following a Mediterranean diet was associated with a reduced risk of death from heart disease and cancer, as well as a reduced incidence of Parkinson's and Alzheimer's diseases.   The Dietary Guidelines for Americans recommends the Mediterranean diet as an eating plan that can help promote health and prevent disease. And the Mediterranean diet is one your whole family can follow for good health.   The Mediterranean diet emphasizes:  Eating primarily plant-based foods, such as fruits and vegetables, whole grains, legumes and nuts   Replacing butter with healthy fats, such as olive oil   Using herbs and spices instead of salt to flavor foods   Limiting red meat to no more than a few times a month   Eating fish and poultry at least twice a week   Drinking red wine in moderation (optional)  The diet also recognizes the importance of being physically active, and enjoying meals with family and friends.  The Mediterranean diet traditionally includes fruits,  "vegetables and grains. For example, residents of [facility] average six or more servings a day of antioxidant-rich fruits and vegetables.  Grains in the Mediterranean region are typically whole grain and usually contain very few unhealthy trans fats, and bread is an important part of the diet. However, throughout the Mediterranean region, bread is eaten plain or dipped in olive oil -- not eaten with butter or margarine, which contains saturated or trans fats.   Nuts are another part of a healthy Mediterranean diet. Nuts are high in fat, but most of the fat is healthy. Because nuts are high in calories, they should not be eaten in large amounts -- generally no more than a handful a day. For the best nutrition, avoid candied or honey-roasted and heavily salted nuts.  The focus of the Mediterranean diet isn't on limiting total fat consumption, but rather on choosing healthier types of fat. The Mediterranean diet discourages saturated fats and hydrogenated oils (trans fats), both of which contribute to heart disease.  The Mediterranean diet features olive oil as the primary source of fat. Olive oil is mainly monounsaturated fat -- a type of fat that can help reduce low-density lipoprotein (LDL) cholesterol levels when used in place of saturated or trans fats. \"Extra-virgin\" and \"virgin\" olive oils (the least processed forms) also contain the highest levels of protective plant compounds that provide antioxidant effects.  Canola oil and some nuts contain the beneficial linolenic acid (a type of omega-3 fatty acid) in addition to healthy unsaturated fat. Omega-3 fatty acids lower triglycerides, decrease blood clotting, and are associated with decreased incidence of sudden heart attacks, improve the health of your blood vessels, and help moderate blood pressure. Fatty fish -- such as mackerel, lake trout, herring, sardines, albacore tuna and salmon -- are rich sources of omega-3 fatty acids. Fish is eaten on a regular basis in " the Mediterranean diet.  The health effects of alcohol have been debated for many years, and some doctors are reluctant to encourage alcohol consumption because of the health consequences of excessive drinking. However, alcohol -- in moderation -- has been associated with a reduced risk of heart disease in some research studies.  The Mediterranean diet typically includes a moderate amount of wine, usually red wine. This means no more than 5 ounces (148 milliliters) of wine daily for women of all ages and men older than age 65 and no more than 10 ounces (296 milliliters) of wine daily for younger men. More than this may increase the risk of health problems, including increased risk of certain types of cancer.   If you're unable to limit your alcohol intake to the amounts defined above, if you have a personal or family history of alcohol abuse, or if you have heart or liver disease, refrain from drinking wine or any other alcohol.  The Mediterranean diet is a delicious and healthy way to eat. Many people who switch to this style of eating say they'll never eat any other way. Here are some specific steps to get you started:   Eat your veggies and fruits -- and switch to whole grains. Avariety of plant foods should make up the majority of your meals. They should be minimally processed -- fresh and whole are best. Include veggies and fruits in every meal and eat them for snacks as well. Switch to whole-grain bread and cereal, and begin to eat more whole-grain rice and pasta products. Keep baby carrots, apples and bananas on hand for quick, satisfying snacks. Fruit salads are a wonderful way to eat a variety of healthy fruit.   Go nuts. Nuts and seeds are good sources of fiber, protein and healthy fats. Keep almonds, cashews, pistachios and walnuts on hand for a quick snack. Choose natural peanut butter, rather than the kind with hydrogenated fat added. Try blended sesame seeds (tahini) as a dip or spread for bread.    Pass on the butter. Try olive or canola oil as a healthy replacement for butter or margarine. Lightly drizzle it over vegetables. After cooking pasta, add a touch of olive oil, some garlic and green onions for flavoring. Dip bread in flavored olive oil or lightly spread it on whole-grain bread for a tasty alternative to butter. Try tahini as a dip or spread for bread too.   Spice it up. Herbs and spices make food tasty and can  for salt and fat in recipes.   Go fish. Eat fish at least twice a week. Fresh or water-packed tuna, salmon, trout, mackerel and herring are healthy choices. [place], bake or broil fish for great taste and easy cleanup. Avoid breaded and fried fish.   Rein in the red meat. Limit red meat to no more than a few times a month. Substitute fish and poultry for red meat. When choosing red meat, make sure it's lean and keep portions small (about the size of a deck of cards). Also avoid sausage, goldstein and other high-fat, processed meats.   Choose low-fat dairy. Limit higher fat dairy products, such as whole or 2 percent milk, cheese and ice cream. Switch to skim milk, fat-free yogurt and low-fat cheese

## 2017-09-29 NOTE — MR AVS SNAPSHOT
After Visit Summary   9/29/2017    Cesar Zhao    MRN: [id number]           Patient Information     Date Of Birth          1980        Visit Information        Provider Department      9/29/2017 11:00 AM Blake Jara MD [facility]        Today's Diagnoses     Lightheadedness    -  1    Stress          Care Instructions    The heart-healthy Mediterranean is a healthy eating plan based on typical foods and recipes of Mediterranean-style cooking. Here's how to adopt the Mediterranean diet.   If you're looking for a heart-healthy eating plan, the Mediterranean diet might be right for you. The Mediterranean diet incorporates the basics of healthy eating -- plus a splash of flavorful olive oil and perhaps even a glass of red wine -- among other components characterizing the traditional cooking style of countries bordering the [place].  Most healthy diets include fruits, vegetables, fish and whole grains, and limit unhealthy fats. While these parts of a healthy diet remain tried-and-true, subtle variations or differences in proportions of certain foods may make a difference in your risk of heart disease.  Research has shown that the traditional Mediterranean diet reduces the risk of heart disease. In fact, an analysis of more than 1.5 million healthy adults demonstrated that following a Mediterranean diet was associated with a reduced risk of death from heart disease and cancer, as well as a reduced incidence of Parkinson's and Alzheimer's diseases.   The Dietary Guidelines for Americans recommends the Mediterranean diet as an eating plan that can help promote health and prevent disease. And the Mediterranean diet is one your whole family can follow for good health.   The Mediterranean diet emphasizes:  Eating primarily plant-based foods, such as fruits and vegetables, whole grains, legumes and nuts   Replacing butter with healthy fats, such as olive  "oil   Using herbs and spices instead of salt to flavor foods   Limiting red meat to no more than a few times a month   Eating fish and poultry at least twice a week   Drinking red wine in moderation (optional)  The diet also recognizes the importance of being physically active, and enjoying meals with family and friends.  The Mediterranean diet traditionally includes fruits, vegetables and grains. For example, residents of [facility] average six or more servings a day of antioxidant-rich fruits and vegetables.  Grains in the Mediterranean region are typically whole grain and usually contain very few unhealthy trans fats, and bread is an important part of the diet. However, throughout the Mediterranean region, bread is eaten plain or dipped in olive oil -- not eaten with butter or margarine, which contains saturated or trans fats.   Nuts are another part of a healthy Mediterranean diet. Nuts are high in fat, but most of the fat is healthy. Because nuts are high in calories, they should not be eaten in large amounts -- generally no more than a handful a day. For the best nutrition, avoid candied or honey-roasted and heavily salted nuts.  The focus of the Mediterranean diet isn't on limiting total fat consumption, but rather on choosing healthier types of fat. The Mediterranean diet discourages saturated fats and hydrogenated oils (trans fats), both of which contribute to heart disease.  The Mediterranean diet features olive oil as the primary source of fat. Olive oil is mainly monounsaturated fat -- a type of fat that can help reduce low-density lipoprotein (LDL) cholesterol levels when used in place of saturated or trans fats. \"Extra-virgin\" and \"virgin\" olive oils (the least processed forms) also contain the highest levels of protective plant compounds that provide antioxidant effects.  Canola oil and some nuts contain the beneficial linolenic acid (a type of omega-3 fatty acid) in addition to healthy unsaturated fat. " Omega-3 fatty acids lower triglycerides, decrease blood clotting, and are associated with decreased incidence of sudden heart attacks, improve the health of your blood vessels, and help moderate blood pressure. Fatty fish -- such as mackerel, lake trout, herring, sardines, albacore tuna and salmon -- are rich sources of omega-3 fatty acids. Fish is eaten on a regular basis in the Mediterranean diet.  The health effects of alcohol have been debated for many years, and some doctors are reluctant to encourage alcohol consumption because of the health consequences of excessive drinking. However, alcohol -- in moderation -- has been associated with a reduced risk of heart disease in some research studies.  The Mediterranean diet typically includes a moderate amount of wine, usually red wine. This means no more than 5 ounces (148 milliliters) of wine daily for women of all ages and men older than age 65 and no more than 10 ounces (296 milliliters) of wine daily for younger men. More than this may increase the risk of health problems, including increased risk of certain types of cancer.   If you're unable to limit your alcohol intake to the amounts defined above, if you have a personal or family history of alcohol abuse, or if you have heart or liver disease, refrain from drinking wine or any other alcohol.  The Mediterranean diet is a delicious and healthy way to eat. Many people who switch to this style of eating say they'll never eat any other way. Here are some specific steps to get you started:   Eat your veggies and fruits -- and switch to whole grains. Avariety of plant foods should make up the majority of your meals. They should be minimally processed -- fresh and whole are best. Include veggies and fruits in every meal and eat them for snacks as well. Switch to whole-grain bread and cereal, and begin to eat more whole-grain rice and pasta products. Keep baby carrots, apples and bananas on hand for quick,  satisfying snacks. Fruit salads are a wonderful way to eat a variety of healthy fruit.   Go nuts. Nuts and seeds are good sources of fiber, protein and healthy fats. Keep almonds, cashews, pistachios and walnuts on hand for a quick snack. Choose natural peanut butter, rather than the kind with hydrogenated fat added. Try blended sesame seeds (tahini) as a dip or spread for bread.   Pass on the butter. Try olive or canola oil as a healthy replacement for butter or margarine. Lightly drizzle it over vegetables. After cooking pasta, add a touch of olive oil, some garlic and green onions for flavoring. Dip bread in flavored olive oil or lightly spread it on whole-grain bread for a tasty alternative to butter. Try tahini as a dip or spread for bread too.   Spice it up. Herbs and spices make food tasty and can  for salt and fat in recipes.   Go fish. Eat fish at least twice a week. Fresh or water-packed tuna, salmon, trout, mackerel and herring are healthy choices. [place], bake or broil fish for great taste and easy cleanup. Avoid breaded and fried fish.   Rein in the red meat. Limit red meat to no more than a few times a month. Substitute fish and poultry for red meat. When choosing red meat, make sure it's lean and keep portions small (about the size of a deck of cards). Also avoid sausage, goldstein and other high-fat, processed meats.   Choose low-fat dairy. Limit higher fat dairy products, such as whole or 2 percent milk, cheese and ice cream. Switch to skim milk, fat-free yogurt and low-fat cheese            Follow-ups after your visit        Who to contact     If you have questions or need follow up information about today's clinic visit or your schedule please contact [facility] directly at [phone].  Normal or non-critical lab and imaging results will be communicated to you by MyChart, letter or phone within 4 business days after the clinic has received the results. If you do not  hear from us within 7 days, please contact the clinic through [employer] or phone. If you have a critical or abnormal lab result, we will notify you by phone as soon as possible.  Submit refill requests through [employer] or call your pharmacy and they will forward the refill request to us. Please allow 3 business days for your refill to be completed.          Additional Information About Your Visit        [facility] Information     [employer] gives you secure access to your electronic health record. If you see a primary care provider, you can also send messages to your care team and make appointments. If you have questions, please call your primary care clinic.  If you do not have a primary care provider, please call [phone] and they will assist you.        Care EveryWhere ID     This is your Care EveryWhere ID. This could be used by other organizations to access your [place] medical records  [id number]        Your Vitals Were     Pulse Temperature Pulse Oximetry BMI (Body Mass Index)          67 98.7  F (37.1  C) (Oral) 96% 29.68 kg/m2         Blood Pressure from Last 3 Encounters:   09/29/17 112/76   07/25/17 124/66   04/24/17 120/80    Weight from Last 3 Encounters:   09/29/17 195 lb 3.2 oz (88.5 kg)   07/25/17 189 lb 6.4 oz (85.9 kg)   04/24/17 193 lb 11.2 oz (87.9 kg)              Today, you had the following     No orders found for display       Primary Care Provider Office Phone # Fax #    Blake Jara [phone]       [address] [place] [phone]        Equal Access to Services     [facility]: Hadii aad ku hadasho Soomaali, waaxda luqadaha, qaybta kaalmada rashardegyada, heena guillermo . So [facility] [phone].    ATENCIÓN: Si habla español, tiene a perez disposición servicios gratuitos de asistencia lingüística. Llame al [phone].    We comply with applicable federal civil rights laws and [place] laws. We do not discriminate on the basis of race,  color, national origin, age, disability sex, sexual orientation or gender identity.            Thank you!     Thank you for choosing [facility]  for your care. Our goal is always to provide you with excellent care. Hearing back from our patients is one way we can continue to improve our services. Please take a few minutes to complete the written survey that you may receive in the mail after your visit with us. Thank you!             Your Updated Medication List - Protect others around you: Learn how to safely use, store and throw away your medicines at www.disposemymeds.org.          This list is accurate as of: 9/29/17 11:33 AM.  Always use your most recent med list.                   Brand Name Dispense Instructions for use Diagnosis    cetirizine 10 MG tablet    zyrTEC    30 tablet    TAKE 1 TABLET (10 MG) BY MOUTH EVERY EVENING DURING ALLERGY SEASON    Seasonal allergies       fluticasone 50 MCG/ACT spray    FLONASE    16 g    Spray 2 sprays into both nostrils daily USE ONCE PER DAY DURING THE ALLERGY SEASON    Allergic rhinitis due to pollen

## 2017-11-09 ENCOUNTER — OFFICE VISIT (OUTPATIENT)
Dept: URGENT CARE | Facility: URGENT CARE | Age: 37
End: 2017-11-09
Payer: COMMERCIAL

## 2017-11-09 VITALS
TEMPERATURE: 98.6 F | BODY MASS INDEX: 30.12 KG/M2 | WEIGHT: 198.1 LBS | SYSTOLIC BLOOD PRESSURE: 135 MMHG | HEART RATE: 61 BPM | DIASTOLIC BLOOD PRESSURE: 75 MMHG | OXYGEN SATURATION: 99 %

## 2017-11-09 DIAGNOSIS — K21.9 GASTROESOPHAGEAL REFLUX DISEASE, ESOPHAGITIS PRESENCE NOT SPECIFIED: Primary | ICD-10-CM

## 2017-11-09 DIAGNOSIS — R10.11 ABDOMINAL PAIN, RIGHT UPPER QUADRANT: ICD-10-CM

## 2017-11-09 DIAGNOSIS — R10.13 ABDOMINAL PAIN, EPIGASTRIC: ICD-10-CM

## 2017-11-09 LAB
ALBUMIN SERPL-MCNC: 4.2 G/DL (ref 3.4–5)
ALP SERPL-CCNC: 96 U/L (ref 40–150)
ALT SERPL W P-5'-P-CCNC: 36 U/L (ref 0–70)
AMYLASE SERPL-CCNC: 62 U/L (ref 30–110)
ANION GAP SERPL CALCULATED.3IONS-SCNC: 5 MMOL/L (ref 3–14)
AST SERPL W P-5'-P-CCNC: 29 U/L (ref 0–45)
BASOPHILS # BLD AUTO: 0 10E9/L (ref 0–0.2)
BASOPHILS NFR BLD AUTO: 0.1 %
BILIRUB SERPL-MCNC: 0.6 MG/DL (ref 0.2–1.3)
BUN SERPL-MCNC: 16 MG/DL (ref 7–30)
CALCIUM SERPL-MCNC: 9 MG/DL (ref 8.5–10.1)
CHLORIDE SERPL-SCNC: 104 MMOL/L (ref 94–109)
CO2 SERPL-SCNC: 30 MMOL/L (ref 20–32)
CREAT SERPL-MCNC: 1.11 MG/DL (ref 0.66–1.25)
DIFFERENTIAL METHOD BLD: NORMAL
EOSINOPHIL # BLD AUTO: 0.1 10E9/L (ref 0–0.7)
EOSINOPHIL NFR BLD AUTO: 1.2 %
ERYTHROCYTE [DISTWIDTH] IN BLOOD BY AUTOMATED COUNT: 13.5 % (ref 10–15)
GFR SERPL CREATININE-BSD FRML MDRD: 75 ML/MIN/1.7M2
GLUCOSE SERPL-MCNC: 88 MG/DL (ref 70–99)
HCT VFR BLD AUTO: 46.9 % (ref 40–53)
HGB BLD-MCNC: 16.3 G/DL (ref 13.3–17.7)
LIPASE SERPL-CCNC: 182 U/L (ref 73–393)
LYMPHOCYTES # BLD AUTO: 2.2 10E9/L (ref 0.8–5.3)
LYMPHOCYTES NFR BLD AUTO: 30.3 %
MCH RBC QN AUTO: 29.1 PG (ref 26.5–33)
MCHC RBC AUTO-ENTMCNC: 34.8 G/DL (ref 31.5–36.5)
MCV RBC AUTO: 84 FL (ref 78–100)
MONOCYTES # BLD AUTO: 0.8 10E9/L (ref 0–1.3)
MONOCYTES NFR BLD AUTO: 10.9 %
NEUTROPHILS # BLD AUTO: 4.1 10E9/L (ref 1.6–8.3)
NEUTROPHILS NFR BLD AUTO: 57.5 %
PLATELET # BLD AUTO: 229 10E9/L (ref 150–450)
POTASSIUM SERPL-SCNC: 4.6 MMOL/L (ref 3.4–5.3)
PROT SERPL-MCNC: 8.1 G/DL (ref 6.8–8.8)
RBC # BLD AUTO: 5.61 10E12/L (ref 4.4–5.9)
SODIUM SERPL-SCNC: 139 MMOL/L (ref 133–144)
WBC # BLD AUTO: 7.2 10E9/L (ref 4–11)

## 2017-11-09 PROCEDURE — 85025 COMPLETE CBC W/AUTO DIFF WBC: CPT | Performed by: PHYSICIAN ASSISTANT

## 2017-11-09 PROCEDURE — 82150 ASSAY OF AMYLASE: CPT | Performed by: PHYSICIAN ASSISTANT

## 2017-11-09 PROCEDURE — 99214 OFFICE O/P EST MOD 30 MIN: CPT | Performed by: PHYSICIAN ASSISTANT

## 2017-11-09 PROCEDURE — 83690 ASSAY OF LIPASE: CPT | Performed by: PHYSICIAN ASSISTANT

## 2017-11-09 PROCEDURE — 80053 COMPREHEN METABOLIC PANEL: CPT | Performed by: PHYSICIAN ASSISTANT

## 2017-11-09 PROCEDURE — 36415 COLL VENOUS BLD VENIPUNCTURE: CPT | Performed by: PHYSICIAN ASSISTANT

## 2017-11-09 NOTE — NURSING NOTE
"Chief Complaint   Patient presents with     Abdominal Pain     abdominal discomfort x 5 days       Initial /75  Pulse 61  Temp 98.6  F (37  C) (Oral)  Wt 198 lb 1.6 oz (89.9 kg)  SpO2 99%  BMI 30.12 kg/m2 Estimated body mass index is 30.12 kg/(m^2) as calculated from the following:    Height as of 4/24/17: 5' 8\" (1.727 m).    Weight as of this encounter: 198 lb 1.6 oz (89.9 kg).  Medication Reconciliation: complete    "

## 2017-11-09 NOTE — PROGRESS NOTES
"SUBJECTIVE  HPI:  Cesar Zhao is a 37 year old male who presents with the CC of abdominal/pelvic pain. Onset was 5 days ago.   Pain onset was after not eating for a longer period of time than usual.  Burning, \"feels like a hole\", pain improved with eating, but came back after a few hours.    No nausea or vomiting.    Denies any fevers.      Denies any urinary symptoms.  Last BM was this morning and was normal.      He feels that his appetite is normal.      Past Medical History:   Diagnosis Date     Hyperlipidemia LDL goal < 130     elevated triglycerides     Hypertriglyceridemia      Current Outpatient Prescriptions   Medication Sig Dispense Refill     cetirizine (ZYRTEC) 10 MG tablet TAKE 1 TABLET (10 MG) BY MOUTH EVERY EVENING DURING ALLERGY SEASON 30 tablet 1     fluticasone (FLONASE) 50 MCG/ACT nasal spray Spray 2 sprays into both nostrils daily USE ONCE PER DAY DURING THE ALLERGY SEASON 16 g 11     Social History   Substance Use Topics     Smoking status: Former Smoker     Packs/day: 0.10     Types: Cigarettes     Smokeless tobacco: Never Used     Alcohol use 0.0 oz/week     0 Standard drinks or equivalent per week      Comment: 6x per year       ROS:  CONSTITUTIONAL:NEGATIVE for fever, chills, change in weight  INTEGUMENTARY/SKIN: NEGATIVE for worrisome rashes, moles or lesions  ENT/MOUTH: NEGATIVE for ear, mouth and throat problems  RESP:NEGATIVE for significant cough or SOB  CV: NEGATIVE for chest pain, palpitations or peripheral edema  GI: as per HPI  : negative for dysuria, hematuria, decreased urinary stream, erectile dysfunction  MUSCULOSKELETAL: NEGATIVE for significant arthralgias or myalgia    OBJECTIVE:  /75  Pulse 61  Temp 98.6  F (37  C) (Oral)  Wt 198 lb 1.6 oz (89.9 kg)  SpO2 99%  BMI 30.12 kg/m2  GENERAL APPEARANCE: healthy, alert and no distress  EYES: EOMI,  PERRL, conjunctiva clear  HENT: ear canals and TM's normal.  Nose and mouth without ulcers, erythema or " lesions  NECK: supple, nontender, no lymphadenopathy  RESP: lungs clear to auscultation - no rales, rhonchi or wheezes  CV: regular rates and rhythm, normal S1 S2, no murmur noted  ABDOMEN: soft, normal bowel sounds, tenderness mild epigastric and RUQ without rebound tenderness or masses  SKIN: no suspicious lesions or rashes    (K21.9) Gastroesophageal reflux disease, esophagitis presence not specified  (primary encounter diagnosis)  Comment:   Plan: omeprazole (PRILOSEC) 20 MG CR capsule            (R10.13) Abdominal pain, epigastric  Comment:   Plan: CBC with platelets and differential, Amylase,         Lipase            (R10.11) Abdominal pain, right upper quadrant  Comment:   Plan: CBC with platelets and differential,         Comprehensive metabolic panel (BMP + Alb, Alk         Phos, ALT, AST, Total. Bili, TP)            F/U with PCP for re-check in 3-4 weeks, sooner should symptoms persist or worsen.    Patient expresses understanding and agreement with the assessment and plan as above.

## 2017-11-09 NOTE — PATIENT INSTRUCTIONS
Reflujo Gastroesofágico [Stomach Reflux: Adult]    El esófago es un tubo que lleva la comida desde la boca al estómago. En perez extremo inferior, tiene shannon válvula que naga que suban los ácidos del estómago. Si esta válvula no funciona correctamente, el ácido del estómago sube al esófago. Si esto ocurre repetidamente, el ácido lastima el recubrimiento del esófago.  Jak trastorno es llamado enfermedad de reflujo gastroesofágico (ERGE) o reflujo ácido. Cuando los ácidos estomacales suben hasta el esófago causan ardor, opresión o un dolor sharad en la parte superior del abdomen o en la parte inferior del pecho. El dolor puede esparcirse al christ, la espalda o los hombros, de manera similar al dolor de pecho (angina). Puede herson eructos, un sabor ácido en la parte de atrás de la garganta, tos crónica, o dolor de garganta o ronquera. Los síntomas de la ERGE suelen presentarse asmita el día después de shannon comida abundante, sherry también pueden ocurrir de noche al estar acostado. Fumar y beber alcohol aumentan el riesgo de la ERGE.  La ERGE es shannon enfermedad crónica. Shannon vez que comienza, suele durar toda la daron. El tratamiento incluye cambios en los hábitos alimenticios y el uso de medicamentos bloqueadores de ácido para disminuir la cantidad de ácido en el estómago.  Los síntomas suelen mejorar con el tratamiento, sherry si jak se suspende, los síntomas suelen regresar después de unos pocos meses. Así que la mayoría de las personas con ERGE deberán continuar perez tratamiento.  Cuidados En [place]:    [place] todo el ciclo del medicamento bloqueador de ácido aunque comience a sentirse mejor antes. Jak medicamento puede tardar varios días en controlar completamente los síntomas. Si no puede pagar el medicamento recetado, podría probar los bloqueadores de ácido de venta charlene, tales ruy Pepcid AC, Tagamet, Zantac o Aciphex. Si estos no alivian seda síntomas, puede ensayar un bloqueador de ácido más michael, ricardo ruy Prilosec  OTC.    Para controlar el dolor, puede usar antiácidos tales ruy Tums, Rolaids, Mylanta o Maalox. Estos pueden ser útiles asmita los primeros jorge de herson comenzado los bloqueadores de ácido. Siga las instrucciones de la etiqueta. Es posible que los antiácidos líquidos funcionen mejor que las tabletas. Tenga en cuenta que los antiácidos pueden interferir con la absorción de ciertos medicamentos. Específicamente, no tome Tagamet (cimetidina), Zantac (ranitidina) o Carafate (sucralfato) antes de 1 hora de herson tomado un antiácido. Consulte con perez farmacéutico si tiene alguna rohini.    Evite las comidas grasosas, fritas y muy condimentadas, el café, el chocolate, la menta, las bebidas con gas y las comidas con alto contenido de ácido: tomates, cítricos (naranja, douglas, toronja [pomelo]) [la fruta o el jugo].    Evite el cigarrillo y el alcohol.    Trate de comer en forma moderada, en especial, por la noche. No se acueste inmediatamente después de comer. No coma nada asmita las últimas 3 horas antes de irse a dormir.    Si está excedido de peso, perder peso le ayudará a aliviar los síntomas. Las mujeres no deberían usar corsés ni fajas, dado que eso aumenta la presión en el estómago y empeora el reflujo.    Si los síntomas aparecen mientras usted duerme, coloque shannon cuña de espuma para elevar la parte superior del cuerpo (no solo la mary). O coloque ladrillos de 4 pulgadas (unos 10 cm) debajo de la cabecera de la cama.  Programe shannon VISITA DE CONTROL con perez médico o según le haya indicado nuestro personal. Es posible que necesite hacerse otras pruebas. Si no comienza a mejorar en los siguientes 4 días, comuníquese con perez médico. Si le hicieron shannon radiografía (X-ray) o un electrocardiograma (EKG), éstos serán evaluados por otro especialista. Le informarán de los nuevos hallazgos que puedan afectar la atención médica que necesita.  Busque Prontamente Atención Médica  si algo de lo siguiente ocurre:    El dolor de  estómago empeora o se traslada a la parte inferior baja del abdomen (margaret del apéndice)    Aparece el dolor de pecho o empeora, o se esparce hacia la espalda, el christ, el hombro o el brazo    Vómito persistente (no puede retener los líquidos)    Darrius en el vómito o las heces (de color negruzco o rojizo)    Se siente débil, mareado o tiene dificultad para respirar    Fiebre de 100.4 F (38 C) o más birdie, o ruy le haya indicado perez proveedor de atención médica  Date Last Reviewed: 6/23/2015 2000-2017 [employer]. [address] [place] [address]. Todos los derechos reservados. Esta información no pretende sustituir la atención médica profesional. Sólo perez médico puede diagnosticar y tratar un problema de jose.

## 2017-11-09 NOTE — MR AVS SNAPSHOT
After Visit Summary   11/9/2017    Cesar Zhao    MRN: [id number]           Patient Information     Date Of Birth          1980        Visit Information        Provider Department      11/9/2017 10:35 AM Emilee Harrison PA-C [facility]        Today's Diagnoses     Gastroesophageal reflux disease, esophagitis presence not specified    -  1    Abdominal pain, epigastric        Abdominal pain, right upper quadrant          Care Instructions      Reflujo Gastroesofágico [Stomach Reflux: Adult]    El esófago es un tubo que lleva la comida desde la boca al estómago. En perez extremo inferior, tiene shannon válvula que naga que suban los ácidos del estómago. Si esta válvula no funciona correctamente, el ácido del estómago sube al esófago. Si esto ocurre repetidamente, el ácido lastima el recubrimiento del esófago.  Jak trastorno es llamado enfermedad de reflujo gastroesofágico (ERGE) o reflujo ácido. Cuando los ácidos estomacales suben hasta el esófago causan ardor, opresión o un dolor sharad en la parte superior del abdomen o en la parte inferior del pecho. El dolor puede esparcirse al christ, la espalda o los hombros, de manera similar al dolor de pecho (angina). Puede herson eructos, un sabor ácido en la parte de atrás de la garganta, tos crónica, o dolor de garganta o ronquera. Los síntomas de la ERGE suelen presentarse asmita el día después de shannon comida abundante, sherry también pueden ocurrir de noche al estar acostado. Fumar y beber alcohol aumentan el riesgo de la ERGE.  La ERGE es shannon enfermedad crónica. Shannon vez que comienza, suele durar toda la daron. El tratamiento incluye cambios en los hábitos alimenticios y el uso de medicamentos bloqueadores de ácido para disminuir la cantidad de ácido en el estómago.  Los síntomas suelen mejorar con el tratamiento, sherry si jak se suspende, los síntomas suelen regresar después de unos pocos meses. Así que la  mayoría de las personas con ERGE deberán continuar perez tratamiento.  Cuidados En [place]:    [place] todo el ciclo del medicamento bloqueador de ácido aunque comience a sentirse mejor antes. Giovana medicamento puede tardar varios días en controlar completamente los síntomas. Si no puede pagar el medicamento recetado, podría probar los bloqueadores de ácido de venta charlene, tales ruy Pepcid AC, Tagamet, Zantac o Aciphex. Si estos no alivian seda síntomas, puede ensayar un bloqueador de ácido más michael, ricardo ruy Prilosec OTC.    Para controlar el dolor, puede usar antiácidos tales ruy Tums, Rolaids, Mylanta o Maalox. Estos pueden ser útiles asmita los primeros jorge de herson comenzado los bloqueadores de ácido. Siga las instrucciones de la etiqueta. Es posible que los antiácidos líquidos funcionen mejor que las tabletas. Tenga en cuenta que los antiácidos pueden interferir con la absorción de ciertos medicamentos. Específicamente, no tome Tagamet (cimetidina), Zantac (ranitidina) o Carafate (sucralfato) antes de 1 hora de herson tomado un antiácido. Consulte con perez farmacéutico si tiene alguna rohini.    Evite las comidas grasosas, fritas y muy condimentadas, el café, el chocolate, la menta, las bebidas con gas y las comidas con alto contenido de ácido: tomates, cítricos (naranja, douglas, toronja [pomelo]) [la fruta o el jugo].    Evite el cigarrillo y el alcohol.    Trate de comer en forma moderada, en especial, por la noche. No se acueste inmediatamente después de comer. No coma nada asmita las últimas 3 horas antes de irse a dormir.    Si está excedido de peso, perder peso le ayudará a aliviar los síntomas. Las mujeres no deberían usar corsés ni fajas, dado que eso aumenta la presión en el estómago y empeora el reflujo.    Si los síntomas aparecen mientras usted duerme, coloque shannon cuña de espuma para elevar la parte superior del cuerpo (no solo la mary). O coloque ladrillos de 4 pulgadas (unos 10 cm) debajo de la  cabecera de la cama.  Programe shannon VISITA DE CONTROL con perez médico o según le haya indicado nuestro personal. Es posible que necesite hacerse otras pruebas. Si no comienza a mejorar en los siguientes 4 días, comuníquese con perez médico. Si le hicieron shannon radiografía (X-ray) o un electrocardiograma (EKG), éstos serán evaluados por otro especialista. Le informarán de los nuevos hallazgos que puedan afectar la atención médica que necesita.  Busque Prontamente Atención Médica  si algo de lo siguiente ocurre:    El dolor de estómago empeora o se traslada a la parte inferior baja del abdomen (margaret del apéndice)    Aparece el dolor de pecho o empeora, o se esparce hacia la espalda, el christ, el hombro o el brazo    Vómito persistente (no puede retener los líquidos)    Darrius en el vómito o las heces (de color negruzco o rojizo)    Se siente débil, mareado o tiene dificultad para respirar    Fiebre de 100.4 F (38 C) o más birdie, o ruy le haya indicado perez proveedor de atención médica  Date Last Reviewed: 6/23/2015 2000-2017 [employer]. [address] [place] [address]. Todos los derechos reservados. Esta información no pretende sustituir la atención médica profesional. Sólo perez médico puede diagnosticar y tratar un problema de jose.                Follow-ups after your visit        Who to contact     If you have questions or need follow up information about today's clinic visit or your schedule please contact [facility] directly at [phone].  Normal or non-critical lab and imaging results will be communicated to you by MyChart, letter or phone within 4 business days after the clinic has received the results. If you do not hear from us within 7 days, please contact the clinic through [employer] or phone. If you have a critical or abnormal lab result, we will notify you by phone as soon as possible.  Submit refill requests through [employer] or call your pharmacy and they  will forward the refill request to us. Please allow 3 business days for your refill to be completed.          Additional Information About Your Visit        [facility] Information     [employer] gives you secure access to your electronic health record. If you see a primary care provider, you can also send messages to your care team and make appointments. If you have questions, please call your primary care clinic.  If you do not have a primary care provider, please call [phone] and they will assist you.        Care EveryWhere ID     This is your Care EveryWhere ID. This could be used by other organizations to access your [facility] medical records  [id number]        Your Vitals Were     Pulse Temperature Pulse Oximetry BMI (Body Mass Index)          61 98.6  F (37  C) (Oral) 99% 30.12 kg/m2         Blood Pressure from Last 3 Encounters:   11/09/17 135/75   09/29/17 112/76   07/25/17 124/66    Weight from Last 3 Encounters:   11/09/17 198 lb 1.6 oz (89.9 kg)   09/29/17 195 lb 3.2 oz (88.5 kg)   07/25/17 189 lb 6.4 oz (85.9 kg)              We Performed the Following     Amylase     CBC with platelets and differential     Comprehensive metabolic panel (BMP + Alb, Alk Phos, ALT, AST, Total. Bili, TP)     Lipase          Today's Medication Changes          These changes are accurate as of: 11/9/17 12:29 PM.  If you have any questions, ask your nurse or doctor.               Start taking these medicines.        Dose/Directions    omeprazole 20 MG CR capsule   Commonly known as:  priLOSEC   Used for:  Gastroesophageal reflux disease, esophagitis presence not specified   Started by:  Emilee Harrison PA-C        Dose:  20 mg   Take 1 capsule (20 mg) by mouth daily   Quantity:  30 capsule   Refills:  1            Where to get your medicines      These medications were sent to [place] [id number] [place] [facility] [address]  [address] [facility] [address]     Phone:  [phone]     omeprazole 20  MG CR capsule                Primary Care Provider Office Phone # Fax #    Blake Jara [phone]       [address] [facility] [address]        Equal Access to Services     JOSH LEUNG : Hadlorenza norma lemons sylviao Sonorma, waaxda luqadaha, qaybta kaalmada adeegyada, heena sanchez mima soto. So [facility] [phone].    ATENCIÓN: Si habla español, tiene a perez disposición servicios gratuitos de asistencia lingüística. Llame al [phone].    We comply with applicable federal civil rights laws and [place] laws. We do not discriminate on the basis of race, color, national origin, age, disability, sex, sexual orientation, or gender identity.            Thank you!     Thank you for choosing [facility]  for your care. Our goal is always to provide you with excellent care. Hearing back from our patients is one way we can continue to improve our services. Please take a few minutes to complete the written survey that you may receive in the mail after your visit with us. Thank you!             Your Updated Medication List - Protect others around you: Learn how to safely use, store and throw away your medicines at www.disposemymeds.org.          This list is accurate as of: 11/9/17 12:29 PM.  Always use your most recent med list.                   Brand Name Dispense Instructions for use Diagnosis    cetirizine 10 MG tablet    zyrTEC    30 tablet    TAKE 1 TABLET (10 MG) BY MOUTH EVERY EVENING DURING ALLERGY SEASON    Seasonal allergies       fluticasone 50 MCG/ACT spray    FLONASE    16 g    Spray 2 sprays into both nostrils daily USE ONCE PER DAY DURING THE ALLERGY SEASON    Allergic rhinitis due to pollen       omeprazole 20 MG CR capsule    priLOSEC    30 capsule    Take 1 capsule (20 mg) by mouth daily    Gastroesophageal reflux disease, esophagitis presence not specified

## 2017-11-27 ENCOUNTER — OFFICE VISIT (OUTPATIENT)
Dept: INTERNAL MEDICINE | Facility: CLINIC | Age: 37
End: 2017-11-27
Payer: COMMERCIAL

## 2017-11-27 VITALS
HEART RATE: 68 BPM | DIASTOLIC BLOOD PRESSURE: 60 MMHG | SYSTOLIC BLOOD PRESSURE: 124 MMHG | RESPIRATION RATE: 20 BRPM | WEIGHT: 197.3 LBS | OXYGEN SATURATION: 100 % | HEIGHT: 68 IN | BODY MASS INDEX: 29.9 KG/M2 | TEMPERATURE: 98.1 F

## 2017-11-27 DIAGNOSIS — K30 FUNCTIONAL DYSPEPSIA: ICD-10-CM

## 2017-11-27 DIAGNOSIS — G89.29 CHRONIC ABDOMINAL PAIN: Primary | ICD-10-CM

## 2017-11-27 DIAGNOSIS — R10.9 CHRONIC ABDOMINAL PAIN: Primary | ICD-10-CM

## 2017-11-27 PROCEDURE — 99214 OFFICE O/P EST MOD 30 MIN: CPT | Performed by: INTERNAL MEDICINE

## 2017-11-27 ASSESSMENT — PAIN SCALES - GENERAL: PAINLEVEL: NO PAIN (0)

## 2017-11-27 NOTE — PROGRESS NOTES
"  SUBJECTIVE:   Cesar Zhao is a 37 year old male who presents to clinic today for the following health issues:      ED[place]UC Followup:    [place]  [facility] [place] of visit: 11/9/17-Seen By Emilee Beauchamp  Reason for visit: Upper GI Pain  Current Status: stable/same as before.   Patient states that dairy triggers the upper gi discomfort, also has randomized gas/bloating pain. Last BM was this morning and was normal in size, color, texture. S/S are relieved with time. Currently taking omeprazole which has somewhat helped.      Pt has long hx of various GI complaints - some are more upper GI related - similar to reflux and respond to a degree with PPI or other anti-acid meds.  Has had EGD that was negative a few yrs ago.  Other symptoms - such as bloating, lower abd pain, diarrhea (not present at this time). Also had colonoscopy that was normal.   No n/v, no weight loss.    Problem list and histories reviewed & adjusted, as indicated.  Additional history: as documented    Labs reviewed in EPIC    Reviewed and updated as needed this visit by clinical staff  Tobacco  Allergies       Reviewed and updated as needed this visit by Provider         ROS:  Constitutional, HEENT, cardiovascular, pulmonary, gi and gu systems are negative, except as otherwise noted.      OBJECTIVE:                                                    /60 (BP Location: Right arm, Patient Position: Sitting, Cuff Size: Adult Large)  Pulse 68  Temp 98.1  F (36.7  C) (Oral)  Resp 20  Ht 5' 8\" (1.727 m)  Wt 197 lb 4.8 oz (89.5 kg)  SpO2 100%  BMI 30 kg/m2  Body mass index is 30 kg/(m^2).  GENERAL APPEARANCE: healthy, alert and no distress  HENT: nose and mouth without ulcers or lesions  NECK: no adenopathy, no asymmetry, masses, or scars and thyroid normal to palpation  RESP: lungs clear to auscultation - no rales, rhonchi or wheezes  CV: regular rates and rhythm, normal S1 S2, no S3 or S4 and no murmur, click or rub  ABDOMEN: bowel " sounds normal. Mild lower abdominal soreness noted by pt on exam. Deeper palpation did not result in more significant findings.  SKIN: no suspicious lesions or rashes    Diagnostic test results:  none        ASSESSMENT/PLAN:                                                    1. Chronic abdominal pain  2. Functional dyspepsia  - my feeling here is that this is nothing new. We can see if the PPI helps- try x 8 wks  - try the recommendations given to him - dietary changes. If nothing improves after this , has never had more extensive imaging done other than the endoscopy- we could do u/s, or CT. Lastly, having him go to the see GI specifically their functional bowel subspeciality clinic    Total of 25 minutes were spent with the patient today, over 50% of which was spent in education and/or counselling.     Blake Jara MD  [facility]

## 2017-11-27 NOTE — NURSING NOTE
"Chief Complaint   Patient presents with     Urgent Care     follow up for UPPER GI Discomfort/Pain x 3 weeks, BLUC on 11/9/17       Initial /60 (BP Location: Right arm, Patient Position: Sitting, Cuff Size: Adult Large)  Pulse 68  Temp 98.1  F (36.7  C) (Oral)  Resp 20  Ht 5' 8\" (1.727 m)  Wt 197 lb 4.8 oz (89.5 kg)  SpO2 100%  BMI 30 kg/m2 Estimated body mass index is 30 kg/(m^2) as calculated from the following:    Height as of this encounter: 5' 8\" (1.727 m).    Weight as of this encounter: 197 lb 4.8 oz (89.5 kg).  Medication Reconciliation: zohreh Lan, Medical Assistant      "

## 2017-11-27 NOTE — PATIENT INSTRUCTIONS
Try eliminating dairy or taking lactacid supplement with each meal  Try taking beano with each meal    Look at www.[employer]rition.Bonafide for low FODMAP diet

## 2017-12-06 ENCOUNTER — TRANSFERRED RECORDS (OUTPATIENT)
Dept: HEALTH INFORMATION MANAGEMENT | Facility: CLINIC | Age: 37
End: 2017-12-06

## 2018-08-21 ENCOUNTER — RADIANT APPOINTMENT (OUTPATIENT)
Dept: GENERAL RADIOLOGY | Facility: CLINIC | Age: 38
End: 2018-08-21
Attending: PHYSICIAN ASSISTANT
Payer: COMMERCIAL

## 2018-08-21 ENCOUNTER — OFFICE VISIT (OUTPATIENT)
Dept: URGENT CARE | Facility: URGENT CARE | Age: 38
End: 2018-08-21
Payer: COMMERCIAL

## 2018-08-21 VITALS
RESPIRATION RATE: 16 BRPM | HEART RATE: 61 BPM | OXYGEN SATURATION: 100 % | SYSTOLIC BLOOD PRESSURE: 120 MMHG | WEIGHT: 197.1 LBS | DIASTOLIC BLOOD PRESSURE: 70 MMHG | TEMPERATURE: 98.8 F | HEIGHT: 68 IN | BODY MASS INDEX: 29.87 KG/M2

## 2018-08-21 DIAGNOSIS — M25.522 PAIN IN JOINT INVOLVING UPPER ARM, LEFT: ICD-10-CM

## 2018-08-21 DIAGNOSIS — R07.89 ATYPICAL CHEST PAIN: ICD-10-CM

## 2018-08-21 DIAGNOSIS — R07.89 ATYPICAL CHEST PAIN: Primary | ICD-10-CM

## 2018-08-21 LAB
D DIMER PPP FEU-MCNC: 0.3 UG/ML FEU (ref 0–0.5)
ERYTHROCYTE [SEDIMENTATION RATE] IN BLOOD BY WESTERGREN METHOD: 5 MM/H (ref 0–15)
TROPONIN I SERPL-MCNC: <0.015 UG/L (ref 0–0.04)

## 2018-08-21 PROCEDURE — 84484 ASSAY OF TROPONIN QUANT: CPT | Performed by: PHYSICIAN ASSISTANT

## 2018-08-21 PROCEDURE — 85379 FIBRIN DEGRADATION QUANT: CPT | Performed by: PHYSICIAN ASSISTANT

## 2018-08-21 PROCEDURE — 71046 X-RAY EXAM CHEST 2 VIEWS: CPT | Mod: FY

## 2018-08-21 PROCEDURE — 93000 ELECTROCARDIOGRAM COMPLETE: CPT | Performed by: PHYSICIAN ASSISTANT

## 2018-08-21 PROCEDURE — 85652 RBC SED RATE AUTOMATED: CPT | Performed by: PHYSICIAN ASSISTANT

## 2018-08-21 PROCEDURE — 99215 OFFICE O/P EST HI 40 MIN: CPT | Performed by: PHYSICIAN ASSISTANT

## 2018-08-21 PROCEDURE — 36415 COLL VENOUS BLD VENIPUNCTURE: CPT | Performed by: PHYSICIAN ASSISTANT

## 2018-08-21 RX ORDER — IBUPROFEN 800 MG/1
800 TABLET, FILM COATED ORAL EVERY 8 HOURS PRN
Qty: 30 TABLET | Refills: 1 | Status: SHIPPED | OUTPATIENT
Start: 2018-08-21 | End: 2018-09-14

## 2018-08-21 NOTE — PROGRESS NOTES
"SUBJECTIVE:  Chief Complaint   Patient presents with     Musculoskeletal Problem     left arm pain and soreness, denies no known injury, some chest pain yesterday that came and went     Cesar Zhao is a 37 year old male presents with a chief complaint of left upper arm and chest wall tenderness, pain .   How: using lawnmower a lot.  The patient complained of mild and moderate pain  and has had decreased ROM.  Pain exacerbated by movement.  Relieved by rest.  He treated it initially with no therapy. This is the first time this type of injury has occurred to this patient.     Past Medical History:   Diagnosis Date     Hyperlipidemia LDL goal < 130     elevated triglycerides     Hypertriglyceridemia      Fam HX  HTN  CVD  Obesity    Allergies   Allergen Reactions     No Known Drug Allergies      Social History   Substance Use Topics     Smoking status: Former Smoker     Packs/day: 0.10     Types: Cigarettes     Smokeless tobacco: Never Used     Alcohol use 0.0 oz/week     0 Standard drinks or equivalent per week      Comment: 6x per year       ROS:  CONSTITUTIONAL:NEGATIVE for fever, chills, change in weight  INTEGUMENTARY/SKIN: NEGATIVE for worrisome rashes, moles or lesions  ENT/MOUTH: NEGATIVE for ear, mouth and throat problems  RESP:NEGATIVE for significant cough or SOB  CV: NEGATIVE for chest pain, palpitations or peripheral edema  GI: NEGATIVE for nausea, abdominal pain, heartburn, or change in bowel habits  MUSCULOSKELETAL: POSITIVE  for left arm and chest wall tenderness  VASC: NEGATIVE for cold extremities   NEURO: NEGATIVE for weakness, dizziness or paresthesias    EXAM:   /70  Pulse 61  Temp 98.8  F (37.1  C)  Resp 16  Ht 5' 8\" (1.727 m)  Wt 197 lb 1.6 oz (89.4 kg)  SpO2 100%  BMI 29.97 kg/m2  Gen: healthy,alert,no distress  Extremity: Positive for tenderness left arm   EYE: Negative for redness.  HEENT: TM normal, normal sinus exam  RESP: Normal lung sounds bilaterally  CV: Normal " sinus rhythm   VASC: Pulses are +2 and CRT is brisk  EXTREMITIES: peripheral pulses normal  MS:  Positive for left arm tenderness, localized chest wall tenderness  SKIN: no suspicious lesions or rashes  NEURO: Normal strength and tone, sensory exam grossly normal, mentation intact and speech normal    X-RAY was done and negative for acute changes including fracture Xray read by Umang Julien at time of visit    Results for orders placed or performed in visit on 08/21/18   Troponin I   Result Value Ref Range    Troponin I ES <0.015 0.000 - 0.045 ug/L     EKG: Normal sinus rhythm, incomplete right bundle branch block    ASSESSMENT/PLAN:      ICD-10-CM    1. Atypical chest pain R07.89 EKG 12-lead complete w/read - Clinics     Erythrocyte sedimentation rate auto     D dimer quantitative     Troponin I     XR Chest 2 Views     ibuprofen (ADVIL/MOTRIN) 800 MG tablet   2. Pain in joint involving upper arm, left M25.522 EKG 12-lead complete w/read - Clinics     Erythrocyte sedimentation rate auto     D dimer quantitative     Troponin I     XR Chest 2 Views     ibuprofen (ADVIL/MOTRIN) 800 MG tablet       ROM exercises  Troponin negative for cardiac problems  ESR and D-dimer pending  Follow up with PCP as needed  If D-dimer comes back positive them patient will be sent to the ED for arm ultrasound

## 2018-08-21 NOTE — MR AVS SNAPSHOT
"              After Visit Summary   8/21/2018    Cesar Zhao    MRN: [id number]           Patient Information     Date Of Birth          1980        Visit Information        Provider Department      8/21/2018 2:15 PM Umang Julien PA-C [facility]        Today's Diagnoses     Atypical chest pain    -  1    Pain in joint involving upper arm, left           Follow-ups after your visit        Who to contact     If you have questions or need follow up information about today's clinic visit or your schedule please contact [facility] directly at [phone].  Normal or non-critical lab and imaging results will be communicated to you by GI Trackhart, letter or phone within 4 business days after the clinic has received the results. If you do not hear from us within 7 days, please contact the clinic through GI Trackhart or phone. If you have a critical or abnormal lab result, we will notify you by phone as soon as possible.  Submit refill requests through MashMango or call your pharmacy and they will forward the refill request to us. Please allow 3 business days for your refill to be completed.          Additional Information About Your Visit        MyChart Information     MashMango gives you secure access to your electronic health record. If you see a primary care provider, you can also send messages to your care team and make appointments. If you have questions, please call your primary care clinic.  If you do not have a primary care provider, please call [phone] and they will assist you.        Care EveryWhere ID     This is your Care EveryWhere ID. This could be used by other organizations to access your [facility] medical records  [id number]        Your Vitals Were     Pulse Temperature Respirations Height Pulse Oximetry BMI (Body Mass Index)    61 98.8  F (37.1  C) 16 5' 8\" (1.727 m) 100% 29.97 kg/m2       Blood Pressure from Last 3 Encounters: "   08/21/18 120/70   11/27/17 124/60   11/09/17 135/75    Weight from Last 3 Encounters:   08/21/18 197 lb 1.6 oz (89.4 kg)   11/27/17 197 lb 4.8 oz (89.5 kg)   11/09/17 198 lb 1.6 oz (89.9 kg)              We Performed the Following     D dimer quantitative     EKG 12-lead complete w/read - Clinics     Erythrocyte sedimentation rate auto     Troponin I          Today's Medication Changes          These changes are accurate as of 8/21/18  4:07 PM.  If you have any questions, ask your nurse or doctor.               Start taking these medicines.        Dose/Directions    ibuprofen 800 MG tablet   Commonly known as:  ADVIL/MOTRIN   Used for:  Atypical chest pain, Pain in joint involving upper arm, left   Started by:  Umang Julien PA-C        Dose:  800 mg   Take 1 tablet (800 mg) by mouth every 8 hours as needed for moderate pain   Quantity:  30 tablet   Refills:  1            Where to get your medicines      These medications were sent to [address] [address]     Phone:  [phone]     ibuprofen 800 MG tablet                Primary Care Provider Office Phone # Fax #    Blake Jara [phone]       [address] [place] [phone]        Equal Access to Services     JOSH LEUNG : Hadii norma lemons hadasho Soellaali, waaxda luqadaha, qaybta kaalmada chico, heena soto. So [facility] [phone].    ATENCIÓN: Si habla español, tiene a perez disposición servicios gratuitos de asistencia lingüística. Ileana al [phone].    We comply with applicable federal civil rights laws and [place] laws. We do not discriminate on the basis of race, color, national origin, age, disability, sex, sexual orientation, or gender identity.            Thank you!     Thank you for choosing [facility]  for your care. Our goal is always to provide you with excellent care.  Hearing back from our patients is one way we can continue to improve our services. Please take a few minutes to complete the written survey that you may receive in the mail after your visit with us. Thank you!             Your Updated Medication List - Protect others around you: Learn how to safely use, store and throw away your medicines at www.disposemymeds.org.          This list is accurate as of 8/21/18  4:07 PM.  Always use your most recent med list.                   Brand Name Dispense Instructions for use Diagnosis    cetirizine 10 MG tablet    zyrTEC    30 tablet    TAKE 1 TABLET (10 MG) BY MOUTH EVERY EVENING DURING ALLERGY SEASON    Seasonal allergies       fluticasone 50 MCG/ACT spray    FLONASE    16 g    Spray 2 sprays into both nostrils daily USE ONCE PER DAY DURING THE ALLERGY SEASON    Allergic rhinitis due to pollen       ibuprofen 800 MG tablet    ADVIL/MOTRIN    30 tablet    Take 1 tablet (800 mg) by mouth every 8 hours as needed for moderate pain    Atypical chest pain, Pain in joint involving upper arm, left       omeprazole 20 MG CR capsule    priLOSEC    30 capsule    Take 1 capsule (20 mg) by mouth daily    Gastroesophageal reflux disease, esophagitis presence not specified

## 2018-09-14 ENCOUNTER — OFFICE VISIT (OUTPATIENT)
Dept: INTERNAL MEDICINE | Facility: CLINIC | Age: 38
End: 2018-09-14
Payer: COMMERCIAL

## 2018-09-14 VITALS
TEMPERATURE: 98.1 F | DIASTOLIC BLOOD PRESSURE: 80 MMHG | WEIGHT: 197 LBS | HEART RATE: 60 BPM | BODY MASS INDEX: 29.95 KG/M2 | SYSTOLIC BLOOD PRESSURE: 128 MMHG

## 2018-09-14 DIAGNOSIS — Z13.1 SCREENING FOR DIABETES MELLITUS: ICD-10-CM

## 2018-09-14 DIAGNOSIS — Z00.00 ROUTINE GENERAL MEDICAL EXAMINATION AT A HEALTH CARE FACILITY: Primary | ICD-10-CM

## 2018-09-14 DIAGNOSIS — E78.1 HYPERTRIGLYCERIDEMIA: ICD-10-CM

## 2018-09-14 LAB
ANION GAP SERPL CALCULATED.3IONS-SCNC: 8 MMOL/L (ref 3–14)
BUN SERPL-MCNC: 16 MG/DL (ref 7–30)
CALCIUM SERPL-MCNC: 9 MG/DL (ref 8.5–10.1)
CHLORIDE SERPL-SCNC: 104 MMOL/L (ref 94–109)
CHOLEST SERPL-MCNC: 155 MG/DL
CO2 SERPL-SCNC: 27 MMOL/L (ref 20–32)
CREAT SERPL-MCNC: 1.14 MG/DL (ref 0.66–1.25)
GFR SERPL CREATININE-BSD FRML MDRD: 72 ML/MIN/1.7M2
GLUCOSE SERPL-MCNC: 98 MG/DL (ref 70–99)
HDLC SERPL-MCNC: 31 MG/DL
LDLC SERPL CALC-MCNC: ABNORMAL MG/DL
LDLC SERPL DIRECT ASSAY-MCNC: 78 MG/DL
NONHDLC SERPL-MCNC: 124 MG/DL
POTASSIUM SERPL-SCNC: 4.5 MMOL/L (ref 3.4–5.3)
SODIUM SERPL-SCNC: 139 MMOL/L (ref 133–144)
TRIGL SERPL-MCNC: 463 MG/DL

## 2018-09-14 PROCEDURE — 99395 PREV VISIT EST AGE 18-39: CPT | Performed by: INTERNAL MEDICINE

## 2018-09-14 PROCEDURE — 80048 BASIC METABOLIC PNL TOTAL CA: CPT | Performed by: INTERNAL MEDICINE

## 2018-09-14 PROCEDURE — 36415 COLL VENOUS BLD VENIPUNCTURE: CPT | Performed by: INTERNAL MEDICINE

## 2018-09-14 PROCEDURE — 80061 LIPID PANEL: CPT | Performed by: INTERNAL MEDICINE

## 2018-09-14 PROCEDURE — 83721 ASSAY OF BLOOD LIPOPROTEIN: CPT | Performed by: INTERNAL MEDICINE

## 2018-09-14 NOTE — PROGRESS NOTES
SUBJECTIVE:   CC: Cesar Zhao is an 37 year old male who presents for preventative health visit.     Physical   Annual:     Getting at least 3 servings of Calcium per day:  Yes    Bi-annual eye exam:  Yes    Dental care twice a year:  Yes    Sleep apnea or symptoms of sleep apnea:  None    Diet:  Regular (no restrictions)    Frequency of exercise:  2-3 days/week    Duration of exercise:  15-30 minutes    Taking medications regularly:  Yes    Medication side effects:  None    Additional concerns today:  No      Today's PHQ-2 Score:   PHQ-2 ( 1999 [employer]) 9/14/2018   Q1: Little interest or pleasure in doing things 0   Q2: Feeling down, depressed or hopeless 0   PHQ-2 Score 0   Q1: Little interest or pleasure in doing things Not at all   Q2: Feeling down, depressed or hopeless Not at all   PHQ-2 Score 0     Abuse: Current or Past(Physical, Sexual or Emotional)- No  Do you feel safe in your environment - Yes    Social History   Substance Use Topics     Smoking status: Former Smoker     Packs/day: 0.10     Types: Cigarettes     Smokeless tobacco: Never Used     Alcohol use 0.0 oz/week     0 Standard drinks or equivalent per week      Comment: 6x per year     Alcohol Use 9/14/2018   If you drink alcohol do you typically have greater than 3 drinks per day OR greater than 7 drinks per week? No   No flowsheet data found.    Reviewed orders with patient. Reviewed health maintenance and updated orders accordingly - Yes       Reviewed and updated as needed this visit by clinical staff  Tobacco  Allergies  Soc Hx        Reviewed and updated as needed this visit by Provider  Tobacco  Soc Hx         Review of Systems  CONSTITUTIONAL: NEGATIVE for fever, chills, change in weight  INTEGUMENTARY/SKIN: NEGATIVE for worrisome rashes, moles or lesions  EYES: NEGATIVE for vision changes or irritation  ENT: NEGATIVE for ear, mouth and throat problems  RESP: NEGATIVE for significant cough or SOB  CV: NEGATIVE for chest pain,  palpitations or peripheral edema  GI: NEGATIVE for nausea, abdominal pain, heartburn, or change in bowel habits   male: negative for dysuria, hematuria, decreased urinary stream, erectile dysfunction, urethral discharge  MUSCULOSKELETAL: NEGATIVE for significant arthralgias or myalgia  NEURO: NEGATIVE for weakness, dizziness or paresthesias  PSYCHIATRIC: NEGATIVE for changes in mood or affect    OBJECTIVE:   /80  Pulse 60  Temp 98.1  F (36.7  C) (Oral)  Wt 197 lb (89.4 kg)  BMI 29.95 kg/m2  Physical Exam  GENERAL: healthy, alert and no distress  EYES: Eyes grossly normal to inspection, PERRL and conjunctivae and sclerae normal  HENT: ear canals and TM's normal, nose and mouth without ulcers or lesions  NECK: no adenopathy, no asymmetry, masses, or scars and thyroid normal to palpation  RESP: lungs clear to auscultation - no rales, rhonchi or wheezes  CV: regular rate and rhythm, normal S1 S2, no S3 or S4, no murmur, click or rub, no peripheral edema and peripheral pulses strong  ABDOMEN: soft, nontender, no hepatosplenomegaly, no masses and bowel sounds normal   (male): normal male genitalia without lesions or urethral discharge, no hernia  MS: no gross musculoskeletal defects noted, no edema  SKIN: no suspicious lesions or rashes  NEURO: Normal strength and tone, mentation intact and speech normal  PSYCH: mentation appears normal, affect normal/bright  LYMPH: no cervical, supraclavicular, axillary, or inguinal adenopathy    none     ASSESSMENT/PLAN:   1. Routine general medical examination at a health care facility      2. Hypertriglyceridemia, initial TG>1000  - Lipid panel reflex to direct LDL Fasting    3. Screening for diabetes mellitus  - Basic metabolic panel    COUNSELING:   Reviewed preventive health counseling, as reflected in patient instructions    BP Readings from Last 1 Encounters:   09/14/18 128/80     Estimated body mass index is 29.95 kg/(m^2) as calculated from the following:     "Height as of 8/21/18: 5' 8\" (1.727 m).    Weight as of this encounter: 197 lb (89.4 kg).           reports that he has quit smoking. His smoking use included Cigarettes. He smoked 0.10 packs per day. He has never used smokeless tobacco.      Counseling Resources:  ATP IV Guidelines  Pooled Cohorts Equation Calculator  FRAX Risk Assessment  ICSI Preventive Guidelines  Dietary Guidelines for Americans, 2010  USDA's MyPlate  ASA Prophylaxis  Lung CA Screening    Blake Jara MD  [facility]  "

## 2018-09-25 ENCOUNTER — TELEPHONE (OUTPATIENT)
Dept: INTERNAL MEDICINE | Facility: CLINIC | Age: 38
End: 2018-09-25

## 2018-09-25 DIAGNOSIS — J30.1 CHRONIC SEASONAL ALLERGIC RHINITIS DUE TO POLLEN: ICD-10-CM

## 2018-09-25 DIAGNOSIS — J30.1 CHRONIC ALLERGIC RHINITIS DUE TO POLLEN, UNSPECIFIED SEASONALITY: ICD-10-CM

## 2018-09-25 RX ORDER — CETIRIZINE HYDROCHLORIDE 10 MG/1
TABLET ORAL
Qty: 30 TABLET | Refills: 11 | Status: SHIPPED | OUTPATIENT
Start: 2018-09-25 | End: 2020-02-10

## 2018-09-25 RX ORDER — FLUTICASONE PROPIONATE 50 MCG
2 SPRAY, SUSPENSION (ML) NASAL DAILY
Qty: 16 G | Refills: 11 | Status: SHIPPED | OUTPATIENT
Start: 2018-09-25

## 2018-09-25 NOTE — TELEPHONE ENCOUNTER
Reason for Call:  Medication or medication refill:    Do you use a [facility]?  Name of the pharmacy and phone number for the current request:  [facility] [address] [phone]    Name of the medication requested: fluticasone and cetirizine    Other request: Pt is using a new pharmacy that needs new scripts.  Pt would like to get the meds today per pharmacist.  Pt saw Dr Jara 9/14/18.    Can we leave a detailed message on this number? NO    Phone number patient can be reached at: Other phone number:  [phone]  Pharmacy    Best Time: before 6 pm    Call taken on 9/25/2018 at 1:06 PM by BERE HICKEY

## 2018-09-27 NOTE — MR AVS SNAPSHOT
After Visit Summary   11/27/2017    Cesar Zhao    MRN: [id number]           Patient Information     Date Of Birth          1980        Visit Information        Provider Department      11/27/2017 11:40 AM Blake Jara MD [facility]        Today's Diagnoses     Chronic abdominal pain    -  1    Functional dyspepsia          Care Instructions    Try eliminating dairy or taking lactacid supplement with each meal  Try taking beano with each meal    Look at www.myginutrition.com for low FODMAP diet            Follow-ups after your visit        Who to contact     If you have questions or need follow up information about today's clinic visit or your schedule please contact [facility] directly at [phone].  Normal or non-critical lab and imaging results will be communicated to you by MyChart, letter or phone within 4 business days after the clinic has received the results. If you do not hear from us within 7 days, please contact the clinic through [employer] Enzymeshart or phone. If you have a critical or abnormal lab result, we will notify you by phone as soon as possible.  Submit refill requests through SputnikBot or call your pharmacy and they will forward the refill request to us. Please allow 3 business days for your refill to be completed.          Additional Information About Your Visit        MyChart Information     SputnikBot gives you secure access to your electronic health record. If you see a primary care provider, you can also send messages to your care team and make appointments. If you have questions, please call your primary care clinic.  If you do not have a primary care provider, please call [phone] and they will assist you.        Care EveryWhere ID     This is your Care EveryWhere ID. This could be used by other organizations to access your [facility] medical records  [id number]        Your Vitals Were     Pulse Temperature  "Respirations Height Pulse Oximetry BMI (Body Mass Index)    68 98.1  F (36.7  C) (Oral) 20 5' 8\" (1.727 m) 100% 30 kg/m2       Blood Pressure from Last 3 Encounters:   11/27/17 124/60   11/09/17 135/75   09/29/17 112/76    Weight from Last 3 Encounters:   11/27/17 197 lb 4.8 oz (89.5 kg)   11/09/17 198 lb 1.6 oz (89.9 kg)   09/29/17 195 lb 3.2 oz (88.5 kg)              Today, you had the following     No orders found for display       Primary Care Provider Office Phone # Fax #    Blake Jara [phone]       [address] [place] [phone]        Equal Access to Services     JOSH LEUNG : Hadii aad ku hadasho Sonorma, waaxda luqadaha, qaybta kaalmada adeanahy, heena guillermo . So [facility] [phone].    ATENCIÓN: Si habla español, tiene a perez disposición servicios gratuitos de asistencia lingüística. Ileana al [phone].    We comply with applicable federal civil rights laws and [place] laws. We do not discriminate on the basis of race, color, national origin, age, disability, sex, sexual orientation, or gender identity.            Thank you!     Thank you for choosing [facility]  for your care. Our goal is always to provide you with excellent care. Hearing back from our patients is one way we can continue to improve our services. Please take a few minutes to complete the written survey that you may receive in the mail after your visit with us. Thank you!             Your Updated Medication List - Protect others around you: Learn how to safely use, store and throw away your medicines at www.disposemymeds.org.          This list is accurate as of: 11/27/17 12:26 PM.  Always use your most recent med list.                   Brand Name Dispense Instructions for use Diagnosis    cetirizine 10 MG tablet    zyrTEC    30 tablet    TAKE 1 TABLET (10 MG) BY MOUTH EVERY EVENING DURING ALLERGY SEASON    Seasonal allergies       fluticasone " 50 MCG/ACT spray    FLONASE    16 g    Spray 2 sprays into both nostrils daily USE ONCE PER DAY DURING THE ALLERGY SEASON    Allergic rhinitis due to pollen       omeprazole 20 MG CR capsule    priLOSEC    30 capsule    Take 1 capsule (20 mg) by mouth daily    Gastroesophageal reflux disease, esophagitis presence not specified          normal affect/normal behavior

## 2018-10-13 ENCOUNTER — OFFICE VISIT (OUTPATIENT)
Dept: URGENT CARE | Facility: URGENT CARE | Age: 38
End: 2018-10-13
Payer: COMMERCIAL

## 2018-10-13 VITALS
HEIGHT: 68 IN | BODY MASS INDEX: 30.66 KG/M2 | DIASTOLIC BLOOD PRESSURE: 73 MMHG | WEIGHT: 202.3 LBS | HEART RATE: 68 BPM | OXYGEN SATURATION: 98 % | TEMPERATURE: 99.2 F | SYSTOLIC BLOOD PRESSURE: 127 MMHG

## 2018-10-13 DIAGNOSIS — R07.0 THROAT PAIN: ICD-10-CM

## 2018-10-13 DIAGNOSIS — J03.90 EXUDATIVE TONSILLITIS: Primary | ICD-10-CM

## 2018-10-13 LAB
DEPRECATED S PYO AG THROAT QL EIA: NORMAL
SPECIMEN SOURCE: NORMAL

## 2018-10-13 PROCEDURE — 87081 CULTURE SCREEN ONLY: CPT | Performed by: PHYSICIAN ASSISTANT

## 2018-10-13 PROCEDURE — 99213 OFFICE O/P EST LOW 20 MIN: CPT | Performed by: PHYSICIAN ASSISTANT

## 2018-10-13 PROCEDURE — 87880 STREP A ASSAY W/OPTIC: CPT | Performed by: PHYSICIAN ASSISTANT

## 2018-10-13 RX ORDER — AMOXICILLIN 875 MG
875 TABLET ORAL 2 TIMES DAILY
Qty: 20 TABLET | Refills: 0 | Status: SHIPPED | OUTPATIENT
Start: 2018-10-13

## 2018-10-13 RX ORDER — IBUPROFEN 800 MG/1
800 TABLET, FILM COATED ORAL EVERY 8 HOURS PRN
Qty: 30 TABLET | Refills: 1 | Status: SHIPPED | OUTPATIENT
Start: 2018-10-13

## 2018-10-13 NOTE — MR AVS SNAPSHOT
After Visit Summary   10/13/2018    Cesar Zhao    MRN: [id number]           Patient Information     Date Of Birth          1980        Visit Information        Provider Department      10/13/2018 6:35 PM Emilee Harrison PA-C [facility]        Today's Diagnoses     Exudative tonsillitis    -  1    Throat pain          Care Instructions    (J03.90) Exudative tonsillitis  (primary encounter diagnosis)  Comment:   Plan: amoxicillin (AMOXIL) 875 MG tablet, ibuprofen         (ADVIL/MOTRIN) 800 MG tablet            (R07.0) Throat pain  Comment:   Plan: Strep, Rapid Screen, Beta strep group A culture          Salt water gargles.                  Follow-ups after your visit        Who to contact     If you have questions or need follow up information about today's clinic visit or your schedule please contact [facility] directly at [phone].  Normal or non-critical lab and imaging results will be communicated to you by [employer].hart, letter or phone within 4 business days after the clinic has received the results. If you do not hear from us within 7 days, please contact the clinic through Dialogfeedt or phone. If you have a critical or abnormal lab result, we will notify you by phone as soon as possible.  Submit refill requests through TuneUp or call your pharmacy and they will forward the refill request to us. Please allow 3 business days for your refill to be completed.          Additional Information About Your Visit        MyChart Information     TuneUp gives you secure access to your electronic health record. If you see a primary care provider, you can also send messages to your care team and make appointments. If you have questions, please call your primary care clinic.  If you do not have a primary care provider, please call [phone] and they will assist you.        Care EveryWhere ID     This is your Care EveryWhere  "ID. This could be used by other organizations to access your [facility] medical records  [id number]        Your Vitals Were     Pulse Temperature Height Pulse Oximetry BMI (Body Mass Index)       68 99.2  F (37.3  C) (Tympanic) 5' 8\" (1.727 m) 98% 30.76 kg/m2        Blood Pressure from Last 3 Encounters:   10/13/18 127/73   09/14/18 128/80   08/21/18 120/70    Weight from Last 3 Encounters:   10/13/18 202 lb 4.8 oz (91.8 kg)   09/14/18 197 lb (89.4 kg)   08/21/18 197 lb 1.6 oz (89.4 kg)              We Performed the Following     Beta strep group A culture     Strep, Rapid Screen          Today's Medication Changes          These changes are accurate as of 10/13/18  7:27 PM.  If you have any questions, ask your nurse or doctor.               Start taking these medicines.        Dose/Directions    amoxicillin 875 MG tablet   Commonly known as:  AMOXIL   Used for:  Exudative tonsillitis   Started by:  Emilee Harrison PA-C        Dose:  875 mg   Take 1 tablet (875 mg) by mouth 2 times daily   Quantity:  20 tablet   Refills:  0       ibuprofen 800 MG tablet   Commonly known as:  ADVIL/MOTRIN   Used for:  Exudative tonsillitis   Started by:  Emilee Harrison PA-C        Dose:  800 mg   Take 1 tablet (800 mg) by mouth every 8 hours as needed for moderate pain   Quantity:  30 tablet   Refills:  1            Where to get your medicines      Some of these will need a paper prescription and others can be bought over the counter.  Ask your nurse if you have questions.     Bring a paper prescription for each of these medications     amoxicillin 875 MG tablet    ibuprofen 800 MG tablet                Primary Care Provider Office Phone # Fax #    Blake Jara [phone]       [address] [place] [phone]        Equal Access to Services     JOSH LEUNG AH: Belia wardo Sonorma, waaxda luqadaha, qaybta kaalmanataly golden, heena soto. So " [facility] [phone].    ATENCIÓN: Si ananya butcher, tiene a perez disposición servicios gratuitos de asistencia lingüística. Ileana bajwa [phone].    We comply with applicable federal civil rights laws and [place] laws. We do not discriminate on the basis of race, color, national origin, age, disability, sex, sexual orientation, or gender identity.            Thank you!     Thank you for choosing [facility]  for your care. Our goal is always to provide you with excellent care. Hearing back from our patients is one way we can continue to improve our services. Please take a few minutes to complete the written survey that you may receive in the mail after your visit with us. Thank you!             Your Updated Medication List - Protect others around you: Learn how to safely use, store and throw away your medicines at www.disposemymeds.org.          This list is accurate as of 10/13/18  7:27 PM.  Always use your most recent med list.                   Brand Name Dispense Instructions for use Diagnosis    amoxicillin 875 MG tablet    AMOXIL    20 tablet    Take 1 tablet (875 mg) by mouth 2 times daily    Exudative tonsillitis       cetirizine 10 MG tablet    zyrTEC    30 tablet    TAKE 1 TABLET (10 MG) BY MOUTH EVERY EVENING DURING ALLERGY SEASON    Chronic seasonal allergic rhinitis due to pollen, Chronic allergic rhinitis due to pollen, unspecified seasonality       fluticasone 50 MCG/ACT spray    FLONASE    16 g    Spray 2 sprays into both nostrils daily    Chronic allergic rhinitis due to pollen, unspecified seasonality, Chronic seasonal allergic rhinitis due to pollen       ibuprofen 800 MG tablet    ADVIL/MOTRIN    30 tablet    Take 1 tablet (800 mg) by mouth every 8 hours as needed for moderate pain    Exudative tonsillitis

## 2018-10-13 NOTE — NURSING NOTE
"Chief Complaint   Patient presents with     Urgent Care     tonsil pain  sore throat        Initial /73 (BP Location: Right arm, Patient Position: Chair, Cuff Size: Adult Large)  Pulse 68  Temp 99.2  F (37.3  C) (Tympanic)  Ht 5' 8\" (1.727 m)  Wt 202 lb 4.8 oz (91.8 kg)  SpO2 98%  BMI 30.76 kg/m2 Estimated body mass index is 30.76 kg/(m^2) as calculated from the following:    Height as of this encounter: 5' 8\" (1.727 m).    Weight as of this encounter: 202 lb 4.8 oz (91.8 kg)..    BP completed using cuff size: large  MEDICATIONS REVIEWED  SOCIAL AND FAMILY HX REVIEWED  Chen Colón CMA  "

## 2018-10-14 LAB
BACTERIA SPEC CULT: NORMAL
SPECIMEN SOURCE: NORMAL

## 2018-10-14 NOTE — PROGRESS NOTES
"SUBJECTIVE:   Cesar Zhao is a 37 year old male presenting with a chief complaint of   1) throat pain and fever since last night.  Onset of symptoms was as above.  Course of illness is worsening.    Severity moderate  Current and Associated symptoms: as above  Treatment measures tried include Tylenol/Ibuprofen.  Predisposing factors include None.    Past Medical History:   Diagnosis Date     Hyperlipidemia LDL goal < 130     elevated triglycerides     Hypertriglyceridemia      Patient Active Problem List   Diagnosis     Seasonal allergies     Hypertriglyceridemia, initial TG>1000     Overweight (BMI 25.0-29.9)     Hyperlipidemia with target LDL less than 130     Social History   Substance Use Topics     Smoking status: Former Smoker     Packs/day: 0.10     Types: Cigarettes     Smokeless tobacco: Never Used     Alcohol use 0.0 oz/week     0 Standard drinks or equivalent per week      Comment: 6x per year       ROS:  CONSTITUTIONAL:NEGATIVE for fever, chills, change in weight  INTEGUMENTARY/SKIN: NEGATIVE for worrisome rashes, moles or lesions  EYES: NEGATIVE for vision changes or irritation  ENT/MOUTH: as per HPI  RESP:NEGATIVE for significant cough or SOB  CV: NEGATIVE for chest pain, palpitations or peripheral edema  MUSCULOSKELETAL: NEGATIVE for significant arthralgias or myalgia  NEURO: NEGATIVE for weakness, dizziness or paresthesias  Review of systems negative except as stated above.    OBJECTIVE  :/73 (BP Location: Right arm, Patient Position: Chair, Cuff Size: Adult Large)  Pulse 68  Temp 99.2  F (37.3  C) (Tympanic)  Ht 5' 8\" (1.727 m)  Wt 202 lb 4.8 oz (91.8 kg)  SpO2 98%  BMI 30.76 kg/m2  GENERAL APPEARANCE: healthy, alert and no distress  EYES: EOMI,  PERRL, conjunctiva clear  HENT: ear canals and TM's normal.  Nose and mouth without ulcers, erythema or lesions  HENT: tonsillar hypertrophy, tonsillar erythema and tonsillar exudate  NECK: supple with anterior lymphadenopathy  RESP: " lungs clear to auscultation - no rales, rhonchi or wheezes  CV: regular rates and rhythm, normal S1 S2, no murmur noted  ABDOMEN:  soft, nontender, no HSM or masses and bowel sounds normal  NEURO: Normal strength and tone, sensory exam grossly normal,  normal speech and mentation  SKIN: no suspicious lesions or rashes    (J03.90) Exudative tonsillitis  (primary encounter diagnosis)  Comment:   Plan: amoxicillin (AMOXIL) 875 MG tablet, ibuprofen         (ADVIL/MOTRIN) 800 MG tablet            (R07.0) Throat pain  Comment:   Plan: Strep, Rapid Screen, Beta strep group A culture          Salt water gargles.

## 2018-10-14 NOTE — PATIENT INSTRUCTIONS
(J03.90) Exudative tonsillitis  (primary encounter diagnosis)  Comment:   Plan: amoxicillin (AMOXIL) 875 MG tablet, ibuprofen         (ADVIL/MOTRIN) 800 MG tablet            (R07.0) Throat pain  Comment:   Plan: Strep, Rapid Screen, Beta strep group A culture          Salt water gargles.

## 2019-03-17 ENCOUNTER — APPOINTMENT (OUTPATIENT)
Dept: ULTRASOUND IMAGING | Facility: CLINIC | Age: 39
End: 2019-03-17
Attending: EMERGENCY MEDICINE
Payer: COMMERCIAL

## 2019-03-17 ENCOUNTER — HOSPITAL ENCOUNTER (EMERGENCY)
Facility: CLINIC | Age: 39
Discharge: HOME | End: 2019-03-17
Attending: EMERGENCY MEDICINE | Admitting: EMERGENCY MEDICINE
Payer: COMMERCIAL

## 2019-03-17 VITALS
HEART RATE: 63 BPM | DIASTOLIC BLOOD PRESSURE: 83 MMHG | SYSTOLIC BLOOD PRESSURE: 132 MMHG | WEIGHT: 197 LBS | RESPIRATION RATE: 14 BRPM | OXYGEN SATURATION: 100 % | BODY MASS INDEX: 29.86 KG/M2 | HEIGHT: 68 IN | TEMPERATURE: 98.2 F

## 2019-03-17 DIAGNOSIS — K29.70 GASTRITIS WITHOUT BLEEDING, UNSPECIFIED CHRONICITY, UNSPECIFIED GASTRITIS TYPE: ICD-10-CM

## 2019-03-17 LAB
ALBUMIN SERPL-MCNC: 4.5 G/DL (ref 3.4–5)
ALP SERPL-CCNC: 95 U/L (ref 40–150)
ALT SERPL W P-5'-P-CCNC: 41 U/L (ref 0–70)
ANION GAP SERPL CALCULATED.3IONS-SCNC: 6 MMOL/L (ref 3–14)
AST SERPL W P-5'-P-CCNC: 33 U/L (ref 0–45)
BASOPHILS # BLD AUTO: 0 10E9/L (ref 0–0.2)
BASOPHILS NFR BLD AUTO: 0.1 %
BILIRUB SERPL-MCNC: 0.5 MG/DL (ref 0.2–1.3)
BUN SERPL-MCNC: 17 MG/DL (ref 7–30)
CALCIUM SERPL-MCNC: 9.3 MG/DL (ref 8.5–10.1)
CHLORIDE SERPL-SCNC: 105 MMOL/L (ref 94–109)
CO2 SERPL-SCNC: 28 MMOL/L (ref 20–32)
CREAT SERPL-MCNC: 1.27 MG/DL (ref 0.66–1.25)
DIFFERENTIAL METHOD BLD: NORMAL
EOSINOPHIL # BLD AUTO: 0.1 10E9/L (ref 0–0.7)
EOSINOPHIL NFR BLD AUTO: 1.6 %
ERYTHROCYTE [DISTWIDTH] IN BLOOD BY AUTOMATED COUNT: 13.1 % (ref 10–15)
GFR SERPL CREATININE-BSD FRML MDRD: 71 ML/MIN/{1.73_M2}
GLUCOSE SERPL-MCNC: 98 MG/DL (ref 70–99)
HCT VFR BLD AUTO: 46.2 % (ref 40–53)
HGB BLD-MCNC: 16.3 G/DL (ref 13.3–17.7)
IMM GRANULOCYTES # BLD: 0 10E9/L (ref 0–0.4)
IMM GRANULOCYTES NFR BLD: 0.2 %
LIPASE SERPL-CCNC: 143 U/L (ref 73–393)
LYMPHOCYTES # BLD AUTO: 3.8 10E9/L (ref 0.8–5.3)
LYMPHOCYTES NFR BLD AUTO: 42.2 %
MCH RBC QN AUTO: 29.3 PG (ref 26.5–33)
MCHC RBC AUTO-ENTMCNC: 35.3 G/DL (ref 31.5–36.5)
MCV RBC AUTO: 83 FL (ref 78–100)
MONOCYTES # BLD AUTO: 0.6 10E9/L (ref 0–1.3)
MONOCYTES NFR BLD AUTO: 6.5 %
NEUTROPHILS # BLD AUTO: 4.4 10E9/L (ref 1.6–8.3)
NEUTROPHILS NFR BLD AUTO: 49.4 %
NRBC # BLD AUTO: 0 10*3/UL
NRBC BLD AUTO-RTO: 0 /100
PLATELET # BLD AUTO: 201 10E9/L (ref 150–450)
POTASSIUM SERPL-SCNC: 3.7 MMOL/L (ref 3.4–5.3)
PROT SERPL-MCNC: 8.3 G/DL (ref 6.8–8.8)
RBC # BLD AUTO: 5.56 10E12/L (ref 4.4–5.9)
SODIUM SERPL-SCNC: 139 MMOL/L (ref 133–144)
WBC # BLD AUTO: 9 10E9/L (ref 4–11)

## 2019-03-17 PROCEDURE — 99284 EMERGENCY DEPT VISIT MOD MDM: CPT | Mod: 25

## 2019-03-17 PROCEDURE — 25000132 ZZH RX MED GY IP 250 OP 250 PS 637: Performed by: EMERGENCY MEDICINE

## 2019-03-17 PROCEDURE — 85025 COMPLETE CBC W/AUTO DIFF WBC: CPT | Performed by: EMERGENCY MEDICINE

## 2019-03-17 PROCEDURE — 83690 ASSAY OF LIPASE: CPT | Performed by: EMERGENCY MEDICINE

## 2019-03-17 PROCEDURE — 80053 COMPREHEN METABOLIC PANEL: CPT | Performed by: EMERGENCY MEDICINE

## 2019-03-17 PROCEDURE — 25000125 ZZHC RX 250: Performed by: EMERGENCY MEDICINE

## 2019-03-17 PROCEDURE — 76705 ECHO EXAM OF ABDOMEN: CPT

## 2019-03-17 RX ADMIN — LIDOCAINE HYDROCHLORIDE 30 ML: 20 SOLUTION ORAL; TOPICAL at 19:57

## 2019-03-17 ASSESSMENT — ENCOUNTER SYMPTOMS
FEVER: 0
DIARRHEA: 0
ABDOMINAL PAIN: 1
VOMITING: 0
UNEXPECTED WEIGHT CHANGE: 0
BLOOD IN STOOL: 0
BACK PAIN: 0
CONSTIPATION: 0
SHORTNESS OF BREATH: 0
CHILLS: 0
NAUSEA: 1

## 2019-03-17 ASSESSMENT — MIFFLIN-ST. JEOR: SCORE: 1788.09

## 2019-03-17 NOTE — ED AVS SNAPSHOT
Emergency Department  [address] [facility] [address]  Phone:  [phone]  Fax:  [phone]                                    Cesar Zhao   MRN: [id number]    Department:   Emergency Department   Date of Visit:  3/17/2019           After Visit Summary Signature Page    I have received my discharge instructions, and my questions have been answered. I have discussed any challenges I see with this plan with the nurse or doctor.    ..........................................................................................................................................  Patient/Patient Representative Signature      ..........................................................................................................................................  Patient Representative Print Name and Relationship to Patient    ..................................................               ................................................  Date                                   Time    ..........................................................................................................................................  Reviewed by Signature/Title    ...................................................              ..............................................  Date                                               Time          22EPIC Rev 08/18

## 2019-03-18 NOTE — ED PROVIDER NOTES
"  History   Chief Complaint:  Abdominal Pain     HPI   Cesar Zhao is a 38 year old male with a history of hyperlipidemia who presents with abdominal pain. The patient reports that for the past 1-2 days he has had some upper abdominal pain worse in the left upper quadrant. He states that yesterday morning this pain worsened. He describes the pain as a burning sensation similar to heartburn. The pain is exacerbated by eating. The patient also endorses having nausea today. He has not tried any medications for the symptoms. He does report that he has had similar symptoms in the past for which he has seen [facility]. The patient denies fever, chills, vomiting, back pain, shortness of breath, bowel symptoms including black or bloody bowel movements, and unexpected weight changes. He has no prior history of abdominal surgeries.    Allergies:  No known drug allergies    Medications:    Zyrtec    Past Medical History:    Hyperlipidemia  Hypertriglyceridemia  Seasonal allergies    Past Surgical History:    Negative for any abdominal surgeries    Family History:    Hypertension  Asthma  Seasonal allergies    Social History:  Smoking status: Former smoker  Alcohol use: Yes  Marital Status:   [2]    Review of Systems   Constitutional: Negative for chills, fever and unexpected weight change.   Respiratory: Negative for shortness of breath.    Gastrointestinal: Positive for abdominal pain (upper) and nausea. Negative for blood in stool, constipation, diarrhea and vomiting.   Musculoskeletal: Negative for back pain.   All other systems reviewed and are negative.    Physical Exam   Patient Vitals for the past 24 hrs:   BP Temp Temp src Pulse Resp SpO2 Height Weight   03/17/19 2053 132/83 -- -- 63 -- -- -- --   03/17/19 1936 155/79 98.2  F (36.8  C) Oral 69 14 100 % 1.727 m (5' 8\") 89.4 kg (197 lb)     Physical Exam  SKIN:  Warm, dry.  No jaundice.    HEMATOLOGIC/IMMUNOLOGIC/LYMPHATIC:  No pallor.  EYES:  " Conjunctivae normal.  Anicteric.  CARDIOVASCULAR:  Regular rate and rhythm.  No murmur.  RESPIRATORY:  No respiratory distress, breath sounds equal and normal.  GASTROINTESTINAL:  Soft non-tender abdomen with active bowel sounds.  No distension.  No abdominal mass.  MUSCULOSKELETAL:  Normal body habitus.  NEUROLOGIC:  Alert, conversant.  PSYCHIATRIC:  Normal mood.    Emergency Department Course   Imaging:  Radiographic findings were communicated with the patient who voiced understanding of the findings.    Abdomen US. Limited (RUQ Only):  1. Diffusely contracted gallbladder. No visible gallstones or biliary  dilatation.  2. Fatty liver.   As read by Radiology.    Laboratory:  CBC: WNL (WBC 9.0, HGB 16.3, )  CMP: Creatinine 1.27 (H) o/w WNL  Lipase: 143    Interventions:  1957: GI Cocktail - Maalox 15 mL, Viscous Lidocaine 15 mL, 30 mL suspension PO    Emergency Department Course:  Past medical records, nursing notes, and vitals reviewed.   1940: I performed an exam of the patient and obtained history, as documented above.  IV inserted and blood drawn.  The patient was sent for an RUQ abdominal ultrasound while in the emergency department, findings above.    2037: I rechecked the patient. Explained findings to the patient.    Findings and plan explained to the patient. Patient discharged home with instructions regarding supportive care, medications, and reasons to return. The importance of close follow-up was reviewed.     Impression & Plan    Medical Decision Making:  Cesar Zhao is a 38 year old male who presents with epigastric discomfort and I considered biliary tract disease in addition to peptic ulcer disease or gastritis. He had a benign abdominal exam so I doubt surgical pathology such as perforated bowel. Evaluation was reassuring. He did improve with a GI cocktail so that would suggest that he may be suffering gastritis. I advised to continue antacids and follow up with his primary care  physician for recheck and follow up as needed.    Diagnosis:    ICD-10-CM   1. Gastritis without bleeding, unspecified chronicity, unspecified gastritis type K29.70       Disposition:  Discharged to home.      Dwayne Thrasher  3/17/2019    EMERGENCY DEPARTMENT  IDwayne, am serving as a scribe at 7:40 PM on 3/17/2019 to document services personally performed by Artie Hyatt MD based on my observations and the provider's statements to me.        Artie Hyatt MD  03/17/19 0308

## 2019-04-29 ENCOUNTER — TRANSFERRED RECORDS (OUTPATIENT)
Dept: HEALTH INFORMATION MANAGEMENT | Facility: CLINIC | Age: 39
End: 2019-04-29

## 2019-09-05 ENCOUNTER — HOSPITAL ENCOUNTER (EMERGENCY)
Facility: CLINIC | Age: 39
Discharge: HOME | End: 2019-09-05
Attending: EMERGENCY MEDICINE | Admitting: EMERGENCY MEDICINE
Payer: COMMERCIAL

## 2019-09-05 VITALS
BODY MASS INDEX: 28.79 KG/M2 | TEMPERATURE: 97.8 F | WEIGHT: 190 LBS | HEIGHT: 68 IN | RESPIRATION RATE: 20 BRPM | DIASTOLIC BLOOD PRESSURE: 71 MMHG | OXYGEN SATURATION: 100 % | SYSTOLIC BLOOD PRESSURE: 130 MMHG

## 2019-09-05 DIAGNOSIS — R07.9 CHEST PAIN, UNSPECIFIED TYPE: ICD-10-CM

## 2019-09-05 LAB
BASOPHILS # BLD AUTO: 0 10E9/L (ref 0–0.2)
BASOPHILS NFR BLD AUTO: 0.3 %
D DIMER PPP FEU-MCNC: 0.5 UG/ML FEU (ref 0–0.5)
DIFFERENTIAL METHOD BLD: ABNORMAL
EOSINOPHIL # BLD AUTO: 0.3 10E9/L (ref 0–0.7)
EOSINOPHIL NFR BLD AUTO: 3.2 %
ERYTHROCYTE [DISTWIDTH] IN BLOOD BY AUTOMATED COUNT: 13.1 % (ref 10–15)
HCT VFR BLD AUTO: 43.2 % (ref 40–53)
HGB BLD-MCNC: 15.2 G/DL (ref 13.3–17.7)
IMM GRANULOCYTES # BLD: 0 10E9/L (ref 0–0.4)
IMM GRANULOCYTES NFR BLD: 0.4 %
INTERPRETATION ECG - MUSE: NORMAL
LYMPHOCYTES # BLD AUTO: 2.6 10E9/L (ref 0.8–5.3)
LYMPHOCYTES NFR BLD AUTO: 34.1 %
MCH RBC QN AUTO: 29.2 PG (ref 26.5–33)
MCHC RBC AUTO-ENTMCNC: 35.2 G/DL (ref 31.5–36.5)
MCV RBC AUTO: 83 FL (ref 78–100)
MONOCYTES # BLD AUTO: 0.5 10E9/L (ref 0–1.3)
MONOCYTES NFR BLD AUTO: 6.5 %
NEUTROPHILS # BLD AUTO: 4.3 10E9/L (ref 1.6–8.3)
NEUTROPHILS NFR BLD AUTO: 55.5 %
NRBC # BLD AUTO: 0.1 10*3/UL
NRBC BLD AUTO-RTO: 1 /100
PLATELET # BLD AUTO: 217 10E9/L (ref 150–450)
RBC # BLD AUTO: 5.21 10E12/L (ref 4.4–5.9)
TROPONIN I SERPL-MCNC: <0.015 UG/L (ref 0–0.04)
WBC # BLD AUTO: 7.7 10E9/L (ref 4–11)

## 2019-09-05 PROCEDURE — 99283 EMERGENCY DEPT VISIT LOW MDM: CPT

## 2019-09-05 PROCEDURE — 85025 COMPLETE CBC W/AUTO DIFF WBC: CPT | Performed by: EMERGENCY MEDICINE

## 2019-09-05 PROCEDURE — 84484 ASSAY OF TROPONIN QUANT: CPT | Performed by: EMERGENCY MEDICINE

## 2019-09-05 PROCEDURE — 85379 FIBRIN DEGRADATION QUANT: CPT | Performed by: EMERGENCY MEDICINE

## 2019-09-05 ASSESSMENT — ENCOUNTER SYMPTOMS
NAUSEA: 0
VOMITING: 0

## 2019-09-05 ASSESSMENT — MIFFLIN-ST. JEOR: SCORE: 1756.33

## 2019-09-05 NOTE — ED AVS SNAPSHOT
Emergency Department  [address] [facility] [address]  Phone:  [phone]  Fax:  [phone]                                    Cesar Zhao   MRN: [id number]    Department:   Emergency Department   Date of Visit:  9/5/2019           After Visit Summary Signature Page    I have received my discharge instructions, and my questions have been answered. I have discussed any challenges I see with this plan with the nurse or doctor.    ..........................................................................................................................................  Patient/Patient Representative Signature      ..........................................................................................................................................  Patient Representative Print Name and Relationship to Patient    ..................................................               ................................................  Date                                   Time    ..........................................................................................................................................  Reviewed by Signature/Title    ...................................................              ..............................................  Date                                               Time          22EPIC Rev 08/18

## 2019-09-06 NOTE — ED PROVIDER NOTES
"  History     Chief Complaint:  Chest Pain    HPI   Cesar Zhao is a 38 year old male with history of hypertension who presents with chest pain. The patient reports onset of mid line back pain which radiated to left and right sided chest pain yesterday around 1200 noon, and then worsened in the evening. Mechanical movements and deep breathing make it worse. He denies any fainting, nausea, vomiting, or history of blood clots. 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     CARDIAC RISK FACTORS:  Sex:    Male  Tobacco:   Former smoker  Hypertension:   Yes  Hyperlipidemia:  No  Diabetes:   No  Family History:  No    Allergies:  No known drug allergies    Medications:    Flonase  Zyrtec    Past Medical History:    Hyperlipidemia  Hypertriglyceridemia    Past Surgical History:    History reviewed. No pertinent surgical history.    Family History:    Hypertension  Asthma  Allergies    Social History:  Smoking status: Former smoker  Alcohol use: Yes  Drug use: No  PCP: Blake Jara  Presents to the ED with his daughter  Marital Status:       Review of Systems   Cardiovascular: Positive for chest pain.   Gastrointestinal: Negative for nausea and vomiting.   Neurological: Negative for syncope.       Physical Exam     Patient Vitals for the past 24 hrs:   BP Temp Temp src Heart Rate Resp SpO2 Height Weight   09/05/19 2149 (!) 157/78 97.8  F (36.6  C) Oral 71 16 99 % 1.727 m (5' 8\") 86.2 kg (190 lb)       Physical Exam  Vitals: reviewed by me  General: Pt seen on [facility], cooperative, and alert to conversation  Eyes: Tracking well, clear conjunctiva BL  ENT: MMM, midline trachea.   Lungs:  No tachypnea, no accessory muscle use. No respiratory distress.   Robust lung sounds on both sides on auscultation   CV: Rate as above, regular rhythm.    Abd: Soft, non tender, no guarding, no rebound. Non distended  MSK: no peripheral edema or joint effusion.  No " evidence of trauma  Skin: No rash, normal turgor and temperature  Neuro: Clear speech and no facial droop.  Psych: Not RIS, no e/o AH/VH    Emergency Department Course   Laboratory:  CBC: WNL (WBC 7.7, HGB 15.2, )  D dimer: 0.5  Troponin I (2217): <0.015    Emergency Department Course:  Past medical records, nursing notes, and vitals reviewed.  2216: I performed an exam of the patient and obtained history, as documented above.    IV inserted and blood drawn.    2301: I rechecked the patient. Findings and plan explained to the patient. Patient discharged home with instructions regarding supportive care, medications, and reasons to return. The importance of close follow-up was reviewed.     Impression & Plan    Medical Decision Making:  Cesar Zhao is a very pleasant 38 year old male who presents to the ER with chest pain. I suspect musculoskeletal cause based on the positional aspect of this. He has a negative D dimer, negative troponin, and is well over 24 hours of onset of pain. He has normal cardiopulmonary exam. He is resting comfortably here in the ER and no evidence of a systemic illness. Pain seems to be in the shoulder predominatly and again worse when he pulls things. Will discharge with very clear return to ED precautions.     Diagnosis:    ICD-10-CM   1. Chest pain, unspecified type R07.9       Disposition: Discharged to home    IDottie, am serving as a scribe at 10:16 PM on 9/5/2019 to document services personally performed by Denis Beasley MD based on my observations and the provider's statements to me.     Dottie Saleh  9/5/2019    EMERGENCY DEPARTMENT       Denis Beasley MD  09/05/19 4152       Denis Beasley MD  09/05/19 9119

## 2019-10-09 ENCOUNTER — TRANSFERRED RECORDS (OUTPATIENT)
Dept: HEALTH INFORMATION MANAGEMENT | Facility: CLINIC | Age: 39
End: 2019-10-09

## 2019-10-11 ENCOUNTER — TRANSFERRED RECORDS (OUTPATIENT)
Dept: HEALTH INFORMATION MANAGEMENT | Facility: CLINIC | Age: 39
End: 2019-10-11

## 2019-10-11 ENCOUNTER — HOSPITAL ENCOUNTER (OUTPATIENT)
Dept: ULTRASOUND IMAGING | Facility: CLINIC | Age: 39
Discharge: HOME | End: 2019-10-11
Attending: NURSE PRACTITIONER | Admitting: NURSE PRACTITIONER
Payer: COMMERCIAL

## 2019-10-11 DIAGNOSIS — R19.7 DIARRHEA, UNSPECIFIED: ICD-10-CM

## 2019-10-11 DIAGNOSIS — R10.11 RIGHT UPPER QUADRANT PAIN: ICD-10-CM

## 2019-10-11 PROCEDURE — 76705 ECHO EXAM OF ABDOMEN: CPT

## 2019-10-25 ENCOUNTER — TRANSFERRED RECORDS (OUTPATIENT)
Dept: HEALTH INFORMATION MANAGEMENT | Facility: CLINIC | Age: 39
End: 2019-10-25

## 2019-10-28 ENCOUNTER — TRANSFERRED RECORDS (OUTPATIENT)
Dept: HEALTH INFORMATION MANAGEMENT | Facility: CLINIC | Age: 39
End: 2019-10-28

## 2019-11-04 ENCOUNTER — HEALTH MAINTENANCE LETTER (OUTPATIENT)
Age: 39
End: 2019-11-04

## 2020-01-28 ENCOUNTER — TRANSFERRED RECORDS (OUTPATIENT)
Dept: HEALTH INFORMATION MANAGEMENT | Facility: CLINIC | Age: 40
End: 2020-01-28

## 2020-02-10 DIAGNOSIS — J30.1 CHRONIC ALLERGIC RHINITIS DUE TO POLLEN: ICD-10-CM

## 2020-02-10 DIAGNOSIS — J30.1 CHRONIC SEASONAL ALLERGIC RHINITIS DUE TO POLLEN: ICD-10-CM

## 2020-02-10 RX ORDER — CETIRIZINE HYDROCHLORIDE 10 MG/1
TABLET ORAL
Qty: 30 TABLET | Refills: 0 | Status: SHIPPED | OUTPATIENT
Start: 2020-02-10

## 2020-02-10 NOTE — TELEPHONE ENCOUNTER
"Requested Prescriptions   Pending Prescriptions Disp Refills     cetirizine (ZYRTEC) 10 MG tablet [Pharmacy Med Name: CETIRIZINE HCL 10MG TABS] 30 tablet 11     Sig: TAKE ONE TABLET BY MOUTH EVERY EVENING DURING ALLERGY SEASON       Antihistamines Protocol Failed - 2/10/2020  1:07 PM        Failed - Recent (12 mo) or future (30 days) visit within the authorizing provider's specialty     Patient has had an office visit with the authorizing provider or a provider within the authorizing providers department within the previous 12 mos or has a future within next 30 days. See \"Patient Info\" tab in inbasket, or \"Choose Columns\" in Meds & Orders section of the refill encounter.              Passed - Patient is 3-64 years of age     Apply weight-based dosing for peds patients age 3 - 12 years of age.    Forward request to provider for patients under the age of 3 or over the age of 64.          Passed - Medication is active on med list        Medication is being filled for 1 time refill only due to:  Patient needs to be seen because it has been more than one year since last visit.    "

## 2020-02-10 NOTE — LETTER
[facility]  [address] [id number] [phone]            Cesar Zhao  [address] [facility] [address]        February 10, 2020    Dear Cesar,    While refilling your prescription today, we noticed that you are due for an appointment with your provider.  We will refill your prescription for 30 days, but a follow-up appointment must be made before any additional refills can be approved.     Taking care of your health is important to us and we look forward to seeing you in the near future.  Please call us at [phone] or [phone] (or use Meetapp) to schedule an appointment.     Please disregard this notice if you have already made an appointment.    Sincerely,        [facility]

## 2020-02-25 ENCOUNTER — TRANSFERRED RECORDS (OUTPATIENT)
Dept: HEALTH INFORMATION MANAGEMENT | Facility: CLINIC | Age: 40
End: 2020-02-25

## 2020-02-28 ENCOUNTER — HOSPITAL ENCOUNTER (OUTPATIENT)
Dept: CT IMAGING | Facility: CLINIC | Age: 40
Discharge: HOME | End: 2020-02-28
Attending: INTERNAL MEDICINE | Admitting: INTERNAL MEDICINE
Payer: COMMERCIAL

## 2020-02-28 DIAGNOSIS — R10.12 LUQ PAIN: ICD-10-CM

## 2020-02-28 DIAGNOSIS — K58.0 IRRITABLE BOWEL SYNDROME WITH DIARRHEA: ICD-10-CM

## 2020-02-28 DIAGNOSIS — R10.11 RUQ PAIN: ICD-10-CM

## 2020-02-28 PROCEDURE — 74160 CT ABDOMEN W/CONTRAST: CPT

## 2020-02-28 PROCEDURE — 25000128 H RX IP 250 OP 636: Performed by: INTERNAL MEDICINE

## 2020-02-28 PROCEDURE — 25000125 ZZHC RX 250: Performed by: INTERNAL MEDICINE

## 2020-02-28 RX ORDER — IOPAMIDOL 755 MG/ML
93 INJECTION, SOLUTION INTRAVASCULAR ONCE
Status: COMPLETED | OUTPATIENT
Start: 2020-02-28 | End: 2020-02-28

## 2020-02-28 RX ADMIN — SODIUM CHLORIDE 67 ML: 9 INJECTION, SOLUTION INTRAVENOUS at 07:10

## 2020-02-28 RX ADMIN — IOPAMIDOL 93 ML: 755 INJECTION, SOLUTION INTRAVENOUS at 07:10

## 2020-03-23 ENCOUNTER — TRANSFERRED RECORDS (OUTPATIENT)
Dept: HEALTH INFORMATION MANAGEMENT | Facility: CLINIC | Age: 40
End: 2020-03-23

## 2020-03-24 ENCOUNTER — TRANSFERRED RECORDS (OUTPATIENT)
Dept: HEALTH INFORMATION MANAGEMENT | Facility: CLINIC | Age: 40
End: 2020-03-24

## 2020-03-24 LAB
CHOLEST SERPL-MCNC: 179 MG/DL (ref 0–200)
GLUCOSE SERPL-MCNC: 93 MG/DL (ref 74–100)
HDLC SERPL-MCNC: 32 MG/DL (ref 40–60)
LDLC SERPL CALC-MCNC: 115 MG/DL
TRIGL SERPL-MCNC: 179 MG/DL (ref 0–200)

## 2020-05-01 ENCOUNTER — TRANSFERRED RECORDS (OUTPATIENT)
Dept: HEALTH INFORMATION MANAGEMENT | Facility: CLINIC | Age: 40
End: 2020-05-01

## 2020-11-22 ENCOUNTER — HEALTH MAINTENANCE LETTER (OUTPATIENT)
Age: 40
End: 2020-11-22

## 2021-09-19 ENCOUNTER — HEALTH MAINTENANCE LETTER (OUTPATIENT)
Age: 41
End: 2021-09-19

## 2022-01-08 ENCOUNTER — HEALTH MAINTENANCE LETTER (OUTPATIENT)
Age: 42
End: 2022-01-08

## 2022-01-22 ENCOUNTER — OFFICE VISIT (OUTPATIENT)
Dept: URGENT CARE | Facility: URGENT CARE | Age: 42
End: 2022-01-22
Payer: COMMERCIAL

## 2022-01-22 VITALS
HEART RATE: 68 BPM | SYSTOLIC BLOOD PRESSURE: 144 MMHG | RESPIRATION RATE: 16 BRPM | TEMPERATURE: 98.2 F | DIASTOLIC BLOOD PRESSURE: 80 MMHG | OXYGEN SATURATION: 100 %

## 2022-01-22 DIAGNOSIS — J01.90 ACUTE SINUSITIS, RECURRENCE NOT SPECIFIED, UNSPECIFIED LOCATION: Primary | ICD-10-CM

## 2022-01-22 DIAGNOSIS — R42 DIZZINESS: ICD-10-CM

## 2022-01-22 DIAGNOSIS — R11.0 NAUSEA: ICD-10-CM

## 2022-01-22 LAB
ALBUMIN SERPL-MCNC: 4.4 G/DL (ref 3.4–5)
ALP SERPL-CCNC: 91 U/L (ref 40–150)
ALT SERPL W P-5'-P-CCNC: 72 U/L (ref 0–70)
AMYLASE SERPL-CCNC: 56 U/L (ref 30–110)
ANION GAP SERPL CALCULATED.3IONS-SCNC: 3 MMOL/L (ref 3–14)
AST SERPL W P-5'-P-CCNC: 40 U/L (ref 0–45)
BASOPHILS # BLD AUTO: 0 10E3/UL (ref 0–0.2)
BASOPHILS NFR BLD AUTO: 0 %
BILIRUB SERPL-MCNC: 0.4 MG/DL (ref 0.2–1.3)
BUN SERPL-MCNC: 15 MG/DL (ref 7–30)
CALCIUM SERPL-MCNC: 8.8 MG/DL (ref 8.5–10.1)
CHLORIDE BLD-SCNC: 106 MMOL/L (ref 94–109)
CO2 SERPL-SCNC: 28 MMOL/L (ref 20–32)
CREAT SERPL-MCNC: 1.04 MG/DL (ref 0.66–1.25)
EOSINOPHIL # BLD AUTO: 0.1 10E3/UL (ref 0–0.7)
EOSINOPHIL NFR BLD AUTO: 2 %
ERYTHROCYTE [DISTWIDTH] IN BLOOD BY AUTOMATED COUNT: 13.5 % (ref 10–15)
GFR SERPL CREATININE-BSD FRML MDRD: >90 ML/MIN/1.73M2
GLUCOSE BLD-MCNC: 113 MG/DL (ref 70–99)
HCT VFR BLD AUTO: 47.6 % (ref 40–53)
HGB BLD-MCNC: 16 G/DL (ref 13.3–17.7)
LYMPHOCYTES # BLD AUTO: 2.5 10E3/UL (ref 0.8–5.3)
LYMPHOCYTES NFR BLD AUTO: 37 %
MCH RBC QN AUTO: 28.1 PG (ref 26.5–33)
MCHC RBC AUTO-ENTMCNC: 33.6 G/DL (ref 31.5–36.5)
MCV RBC AUTO: 84 FL (ref 78–100)
MONOCYTES # BLD AUTO: 0.5 10E3/UL (ref 0–1.3)
MONOCYTES NFR BLD AUTO: 8 %
NEUTROPHILS # BLD AUTO: 3.5 10E3/UL (ref 1.6–8.3)
NEUTROPHILS NFR BLD AUTO: 53 %
PLATELET # BLD AUTO: 231 10E3/UL (ref 150–450)
POTASSIUM BLD-SCNC: 4.1 MMOL/L (ref 3.4–5.3)
PROT SERPL-MCNC: 8.1 G/DL (ref 6.8–8.8)
RBC # BLD AUTO: 5.69 10E6/UL (ref 4.4–5.9)
SODIUM SERPL-SCNC: 137 MMOL/L (ref 133–144)
WBC # BLD AUTO: 6.6 10E3/UL (ref 4–11)

## 2022-01-22 PROCEDURE — 85025 COMPLETE CBC W/AUTO DIFF WBC: CPT | Performed by: FAMILY MEDICINE

## 2022-01-22 PROCEDURE — 36415 COLL VENOUS BLD VENIPUNCTURE: CPT | Performed by: FAMILY MEDICINE

## 2022-01-22 PROCEDURE — 80053 COMPREHEN METABOLIC PANEL: CPT | Performed by: FAMILY MEDICINE

## 2022-01-22 PROCEDURE — 82150 ASSAY OF AMYLASE: CPT | Performed by: FAMILY MEDICINE

## 2022-01-22 PROCEDURE — 99203 OFFICE O/P NEW LOW 30 MIN: CPT | Performed by: FAMILY MEDICINE

## 2022-01-22 RX ORDER — CLINDAMYCIN PHOSPHATE 11.9 MG/ML
SOLUTION TOPICAL
COMMUNITY
Start: 2021-07-06

## 2022-01-22 RX ORDER — FLUTICASONE PROPIONATE 50 MCG
2 SPRAY, SUSPENSION (ML) NASAL DAILY
Qty: 15.8 ML | Refills: 0 | Status: SHIPPED | OUTPATIENT
Start: 2022-01-22 | End: 2022-02-21

## 2022-01-22 RX ORDER — CETIRIZINE HYDROCHLORIDE 10 MG/1
10 TABLET ORAL DAILY
Qty: 14 TABLET | Refills: 0 | Status: SHIPPED | OUTPATIENT
Start: 2022-01-22 | End: 2022-02-05

## 2022-01-22 RX ORDER — AMOXICILLIN 875 MG
875 TABLET ORAL 2 TIMES DAILY
Qty: 20 TABLET | Refills: 0 | Status: SHIPPED | OUTPATIENT
Start: 2022-01-22 | End: 2022-02-01

## 2022-01-23 NOTE — PROGRESS NOTES
SUBJECTIVE: Cesar Zhao is a 41 year old male presenting with a chief complaint of facial pain/pressure and nausea and dizziness.  Onset of symptoms was day(s) ago.  Course of illness is worsening.    Severity moderate  Treatment measures tried include None tried.  Predisposing factors include HX of sinusitis.    Past Medical History:   Diagnosis Date     Hyperlipidemia LDL goal < 130     elevated triglycerides     Hypertriglyceridemia      Allergies   Allergen Reactions     No Known Drug Allergies      Social History     Tobacco Use     Smoking status: Former Smoker     Packs/day: 0.10     Types: Cigarettes     Smokeless tobacco: Never Used   Substance Use Topics     Alcohol use: Yes     Alcohol/week: 0.0 standard drinks     Comment: 6x per year       ROS:  SKIN: no rash  GI: no vomiting    OBJECTIVE:  BP (!) 144/80   Pulse 68   Temp 98.2  F (36.8  C) (Tympanic)   Resp 16   SpO2 100% GENERAL APPEARANCE: healthy, alert and no distress  EYES: EOMI,  PERRL, conjunctiva clear  HENT: ear canals and TM's normal.  Nose and mouth without ulcers, erythema or lesions  NECK: supple, nontender, no lymphadenopathy  RESP: lungs clear to auscultation - no rales, rhonchi or wheezes  CV: regular rates and rhythm, normal S1 S2, no murmur noted  ABDOMEN:  soft, nontender, no HSM or masses and bowel sounds normal  NEURO: Normal strength and tone, sensory exam grossly normal,  normal speech and mentation  SKIN: no suspicious lesions or rashes      ICD-10-CM    1. Acute sinusitis, recurrence not specified, unspecified location  J01.90 amoxicillin (AMOXIL) 875 MG tablet     fluticasone (FLONASE) 50 MCG/ACT nasal spray     cetirizine (ZYRTEC) 10 MG tablet   2. Nausea  R11.0 CBC with Platelets & Differential     Comprehensive metabolic panel     Amylase     cetirizine (ZYRTEC) 10 MG tablet   3. Dizziness  R42 CBC with Platelets & Differential     Comprehensive metabolic panel     Amylase     cetirizine (ZYRTEC) 10 MG tablet        Fluids/Rest, f/u if worse/not any better

## 2022-11-01 NOTE — MR AVS SNAPSHOT
After Visit Summary   9/14/2018    Cesar Zhao    MRN: [id number]           Patient Information     Date Of Birth          1980        Visit Information        Provider Department      9/14/2018 7:00 AM Blake Jara MD [facility]        Today's Diagnoses     Routine general medical examination at a health care facility    -  1    Hypertriglyceridemia, initial TG>1000        Screening for diabetes mellitus          Care Instructions      Preventive Health Recommendations  Male Ages 26 - 39    Yearly exam:             See your health care provider every year in order to  o   Review health changes.   o   Discuss preventive care.    o   Review your medicines if your doctor has prescribed any.    You should be tested each year for STDs (sexually transmitted diseases), if you re at risk.     After age 35, talk to your provider about cholesterol testing. If you are at risk for heart disease, have your cholesterol tested at least every 5 years.     If you are at risk for diabetes, you should have a diabetes test (fasting glucose).  Shots: Get a flu shot each year. Get a tetanus shot every 10 years.     Nutrition:    Eat at least 5 servings of fruits and vegetables daily.     Eat whole-grain bread, whole-wheat pasta and brown rice instead of white grains and rice.     Get adequate Calcium and Vitamin D.     Lifestyle    Exercise for at least 150 minutes a week (30 minutes a day, 5 days a week). This will help you control your weight and prevent disease.     Limit alcohol to one drink per day.     No smoking.     Wear sunscreen to prevent skin cancer.     See your dentist every six months for an exam and cleaning.             Follow-ups after your visit        Who to contact     If you have questions or need follow up information about today's clinic visit or your schedule please contact [facility] directly at [phone].  Normal or  non-critical lab and imaging results will be communicated to you by MyChart, letter or phone within 4 business days after the clinic has received the results. If you do not hear from us within 7 days, please contact the clinic through [employer] or phone. If you have a critical or abnormal lab result, we will notify you by phone as soon as possible.  Submit refill requests through [employer] or call your pharmacy and they will forward the refill request to us. Please allow 3 business days for your refill to be completed.          Additional Information About Your Visit        [employer] Information     [employer] gives you secure access to your electronic health record. If you see a primary care provider, you can also send messages to your care team and make appointments. If you have questions, please call your primary care clinic.  If you do not have a primary care provider, please call [phone] and they will assist you.        Care EveryWhere ID     This is your Care EveryWhere ID. This could be used by other organizations to access your [facility] medical records  [id number]        Your Vitals Were     Pulse Temperature BMI (Body Mass Index)             60 98.1  F (36.7  C) (Oral) 29.95 kg/m2          Blood Pressure from Last 3 Encounters:   09/14/18 128/80   08/21/18 120/70   11/27/17 124/60    Weight from Last 3 Encounters:   09/14/18 197 lb (89.4 kg)   08/21/18 197 lb 1.6 oz (89.4 kg)   11/27/17 197 lb 4.8 oz (89.5 kg)              We Performed the Following     Basic metabolic panel     Lipid panel reflex to direct LDL Fasting        Primary Care Provider Office Phone # Fax #    Blake Jara [phone]       [address] [facility] [address]        Equal Access to Services     JOSH LEUNG : Belia Chu, nimo lovell, roula golden, heena soto. So [facility] [phone].    ATENCIÓN: Si habla español, tiene a perez disposición  servicios gratuitos de asistencia lingüística. Ileana bajwa [phone].    We comply with applicable federal civil rights laws and [place] laws. We do not discriminate on the basis of race, color, national origin, age, disability, sex, sexual orientation, or gender identity.            Thank you!     Thank you for choosing [facility]  for your care. Our goal is always to provide you with excellent care. Hearing back from our patients is one way we can continue to improve our services. Please take a few minutes to complete the written survey that you may receive in the mail after your visit with us. Thank you!             Your Updated Medication List - Protect others around you: Learn how to safely use, store and throw away your medicines at www.disposemymeds.org.          This list is accurate as of 9/14/18  7:44 AM.  Always use your most recent med list.                   Brand Name Dispense Instructions for use Diagnosis    cetirizine 10 MG tablet    zyrTEC    30 tablet    TAKE 1 TABLET (10 MG) BY MOUTH EVERY EVENING DURING ALLERGY SEASON    Seasonal allergies       fluticasone 50 MCG/ACT spray    FLONASE    16 g    Spray 2 sprays into both nostrils daily USE ONCE PER DAY DURING THE ALLERGY SEASON    Allergic rhinitis due to pollen       ibuprofen 800 MG tablet    ADVIL/MOTRIN    30 tablet    Take 1 tablet (800 mg) by mouth every 8 hours as needed for moderate pain    Atypical chest pain, Pain in joint involving upper arm, left       omeprazole 20 MG CR capsule    priLOSEC    30 capsule    Take 1 capsule (20 mg) by mouth daily    Gastroesophageal reflux disease, esophagitis presence not specified          28-Mar-2022

## 2022-11-20 ENCOUNTER — HEALTH MAINTENANCE LETTER (OUTPATIENT)
Age: 42
End: 2022-11-20

## 2023-01-17 ENCOUNTER — OFFICE VISIT (OUTPATIENT)
Dept: URGENT CARE | Facility: URGENT CARE | Age: 43
End: 2023-01-17
Payer: COMMERCIAL

## 2023-01-17 VITALS
RESPIRATION RATE: 20 BRPM | SYSTOLIC BLOOD PRESSURE: 152 MMHG | TEMPERATURE: 98.6 F | OXYGEN SATURATION: 97 % | DIASTOLIC BLOOD PRESSURE: 83 MMHG | HEART RATE: 73 BPM

## 2023-01-17 DIAGNOSIS — J01.90 ACUTE SINUSITIS WITH SYMPTOMS > 10 DAYS: Primary | ICD-10-CM

## 2023-01-17 DIAGNOSIS — R68.83 CHILLS: ICD-10-CM

## 2023-01-17 LAB
FLUAV AG SPEC QL IA: NEGATIVE
FLUBV AG SPEC QL IA: NEGATIVE

## 2023-01-17 PROCEDURE — U0005 INFEC AGEN DETEC AMPLI PROBE: HCPCS | Performed by: PHYSICIAN ASSISTANT

## 2023-01-17 PROCEDURE — U0003 INFECTIOUS AGENT DETECTION BY NUCLEIC ACID (DNA OR RNA); SEVERE ACUTE RESPIRATORY SYNDROME CORONAVIRUS 2 (SARS-COV-2) (CORONAVIRUS DISEASE [COVID-19]), AMPLIFIED PROBE TECHNIQUE, MAKING USE OF HIGH THROUGHPUT TECHNOLOGIES AS DESCRIBED BY CMS-2020-01-R: HCPCS | Performed by: PHYSICIAN ASSISTANT

## 2023-01-17 PROCEDURE — 87804 INFLUENZA ASSAY W/OPTIC: CPT | Performed by: PHYSICIAN ASSISTANT

## 2023-01-17 PROCEDURE — 99214 OFFICE O/P EST MOD 30 MIN: CPT | Mod: CS | Performed by: PHYSICIAN ASSISTANT

## 2023-01-17 RX ORDER — FLUTICASONE PROPIONATE 50 MCG
1 SPRAY, SUSPENSION (ML) NASAL DAILY
Qty: 16 G | Refills: 0 | Status: SHIPPED | OUTPATIENT
Start: 2023-01-17

## 2023-01-17 RX ORDER — ACETAMINOPHEN 500 MG
1000 TABLET ORAL EVERY 6 HOURS PRN
COMMUNITY

## 2023-01-17 NOTE — PATIENT INSTRUCTIONS
(J01.90) Acute sinusitis with symptoms > 10 days  (primary encounter diagnosis)  Comment:   Plan: fluticasone (FLONASE) 50 MCG/ACT nasal spray,         amoxicillin-clavulanate (AUGMENTIN) 875-125 MG         tablet            (R68.83) Chills  Comment:   Plan: Symptomatic COVID-19 Virus (Coronavirus) by PCR        Nasopharyngeal, Influenza A & B Antigen -         Clinic Collect          Saline nasal spray    Tylenol

## 2023-01-17 NOTE — PROGRESS NOTES
Patient presents with:  Urgent Care: Present for dizziness for a few days.  Present for RUQ abdominal pain, nausea and chills.      (J01.90) Acute sinusitis with symptoms > 10 days  (primary encounter diagnosis)  Comment:   Plan: fluticasone (FLONASE) 50 MCG/ACT nasal spray,         amoxicillin-clavulanate (AUGMENTIN) 875-125 MG         tablet            (R68.83) Chills  Comment:   Plan: Symptomatic COVID-19 Virus (Coronavirus) by PCR        Nasopharyngeal, Influenza A & B Antigen -         Clinic Collect          Saline nasal spray    Tylenol    37 minutes spent on the date of the encounter doing chart review, review of test results, interpretation of tests, patient visit, documentation and discussion with family       SUBJECTIVE:   Cesar Zhao is a 42 year old male who presents today with dizziness for the past few days and RUQ pain nausea and chills.      Runny nose and congestion with sinus pressure onset over a week and a half ago.    Dizziness with head movement.      SH: Here with his wife today.    Past Medical History:   Diagnosis Date     Hyperlipidemia LDL goal < 130     elevated triglycerides     Hypertriglyceridemia          Current Outpatient Medications   Medication Sig Dispense Refill     Multiple Vitamins-Iron (DAILY-KELLIE/IRON/BETA-CAROTENE) TABS TAKE 1 TABLET BY MOUTH DAILY. (Patient not taking: Reported on 10/19/2020) 30 tablet 7     Social History     Tobacco Use     Smoking status: Never Smoker     Smokeless tobacco: Never Used   Substance Use Topics     Alcohol use: Not on file     Family History   Problem Relation Age of Onset     Diabetes Mother      Diabetes Father          ROS:    10 point ROS of systems including Constitutional, Eyes, Respiratory, Cardiovascular, Gastroenterology, Genitourinary, Integumentary, Muscularskeletal, Psychiatric ,neurological were all negative except for pertinent positives noted in my HPI       OBJECTIVE:  BP (!) 152/83 (BP Location: Right arm,  Patient Position: Sitting, Cuff Size: Adult Large)   Pulse 73   Temp 98.6  F (37  C) (Tympanic)   Resp 20   SpO2 97%   Physical Exam:  GENERAL APPEARANCE: healthy, alert and no distress  EYES: EOMI,  PERRL, conjunctiva clear  HENT: ear canals and TM's normal.  Nose and mouth without ulcers, erythema or lesions  HENT: nasal turbinates erythematous, swollen and rhinorrhea yellow.  Left turbinates nearly occluding the nasal passage.   NECK: supple, nontender, no lymphadenopathy  RESP: lungs clear to auscultation - no rales, rhonchi or wheezes  CV: regular rates and rhythm, normal S1 S2, no murmur noted  ABDOMEN:  soft, nontender, no HSM or masses and bowel sounds normal  NEURO: Normal strength and tone, sensory exam grossly normal,  normal speech and mentation  SKIN: no suspicious lesions or rashes    Results for orders placed or performed in visit on 01/17/23   Influenza A & B Antigen - Clinic Collect     Status: Normal    Specimen: Nasopharyngeal; Swab   Result Value Ref Range    Influenza A antigen Negative Negative    Influenza B antigen Negative Negative    Narrative    Test results must be correlated with clinical data. If necessary, results should be confirmed by a molecular assay or viral culture.

## 2023-01-18 LAB — SARS-COV-2 RNA RESP QL NAA+PROBE: NEGATIVE

## 2023-04-15 ENCOUNTER — HEALTH MAINTENANCE LETTER (OUTPATIENT)
Age: 43
End: 2023-04-15

## 2024-06-22 ENCOUNTER — HEALTH MAINTENANCE LETTER (OUTPATIENT)
Age: 44
End: 2024-06-22

## 2025-05-12 ENCOUNTER — HOSPITAL ENCOUNTER (EMERGENCY)
Facility: CLINIC | Age: 45
Discharge: LEFT BEFORE DISCHARGE | End: 2025-05-12
Payer: COMMERCIAL

## 2025-05-12 ENCOUNTER — HOSPITAL ENCOUNTER (EMERGENCY)
Facility: CLINIC | Age: 45
Discharge: HOME | End: 2025-05-13
Attending: EMERGENCY MEDICINE | Admitting: EMERGENCY MEDICINE
Payer: COMMERCIAL

## 2025-05-12 DIAGNOSIS — R42 LIGHTHEADEDNESS: ICD-10-CM

## 2025-05-12 LAB
ALBUMIN SERPL BCG-MCNC: 4.5 G/DL (ref 3.5–5.2)
ALP SERPL-CCNC: 104 U/L (ref 40–150)
ALT SERPL W P-5'-P-CCNC: 49 U/L (ref 0–70)
ANION GAP SERPL CALCULATED.3IONS-SCNC: 13 MMOL/L (ref 7–15)
AST SERPL W P-5'-P-CCNC: 35 U/L (ref 0–45)
ATRIAL RATE - MUSE: 73 BPM
BASOPHILS # BLD AUTO: 0 10E3/UL (ref 0–0.2)
BASOPHILS NFR BLD AUTO: 0 %
BILIRUB SERPL-MCNC: 0.4 MG/DL
BUN SERPL-MCNC: 19 MG/DL (ref 6–20)
CALCIUM SERPL-MCNC: 9.4 MG/DL (ref 8.8–10.4)
CHLORIDE SERPL-SCNC: 99 MMOL/L (ref 98–107)
CREAT SERPL-MCNC: 1.48 MG/DL (ref 0.67–1.17)
DIASTOLIC BLOOD PRESSURE - MUSE: NORMAL MMHG
EGFRCR SERPLBLD CKD-EPI 2021: 59 ML/MIN/1.73M2
EOSINOPHIL # BLD AUTO: 0.1 10E3/UL (ref 0–0.7)
EOSINOPHIL NFR BLD AUTO: 2 %
ERYTHROCYTE [DISTWIDTH] IN BLOOD BY AUTOMATED COUNT: 12.6 % (ref 10–15)
GLUCOSE SERPL-MCNC: 111 MG/DL (ref 70–99)
HCO3 SERPL-SCNC: 24 MMOL/L (ref 22–29)
HCT VFR BLD AUTO: 44.8 % (ref 40–53)
HGB BLD-MCNC: 15.6 G/DL (ref 13.3–17.7)
IMM GRANULOCYTES # BLD: 0 10E3/UL
IMM GRANULOCYTES NFR BLD: 0 %
INTERPRETATION ECG - MUSE: NORMAL
LYMPHOCYTES # BLD AUTO: 3.3 10E3/UL (ref 0.8–5.3)
LYMPHOCYTES NFR BLD AUTO: 41 %
MCH RBC QN AUTO: 29.1 PG (ref 26.5–33)
MCHC RBC AUTO-ENTMCNC: 34.8 G/DL (ref 31.5–36.5)
MCV RBC AUTO: 84 FL (ref 78–100)
MONOCYTES # BLD AUTO: 0.6 10E3/UL (ref 0–1.3)
MONOCYTES NFR BLD AUTO: 7 %
NEUTROPHILS # BLD AUTO: 4 10E3/UL (ref 1.6–8.3)
NEUTROPHILS NFR BLD AUTO: 50 %
NRBC # BLD AUTO: 0 10E3/UL
NRBC BLD AUTO-RTO: 0 /100
P AXIS - MUSE: 37 DEGREES
PLATELET # BLD AUTO: 230 10E3/UL (ref 150–450)
POTASSIUM SERPL-SCNC: 3.8 MMOL/L (ref 3.4–5.3)
PR INTERVAL - MUSE: 142 MS
PROT SERPL-MCNC: 7.4 G/DL (ref 6.4–8.3)
QRS DURATION - MUSE: 132 MS
QT - MUSE: 414 MS
QTC - MUSE: 456 MS
R AXIS - MUSE: -27 DEGREES
RBC # BLD AUTO: 5.36 10E6/UL (ref 4.4–5.9)
SODIUM SERPL-SCNC: 136 MMOL/L (ref 135–145)
SYSTOLIC BLOOD PRESSURE - MUSE: NORMAL MMHG
T AXIS - MUSE: 6 DEGREES
TROPONIN T SERPL HS-MCNC: <6 NG/L
VENTRICULAR RATE- MUSE: 73 BPM
WBC # BLD AUTO: 8.1 10E3/UL (ref 4–11)

## 2025-05-12 PROCEDURE — 80053 COMPREHEN METABOLIC PANEL: CPT | Performed by: EMERGENCY MEDICINE

## 2025-05-12 PROCEDURE — 85025 COMPLETE CBC W/AUTO DIFF WBC: CPT | Performed by: EMERGENCY MEDICINE

## 2025-05-12 PROCEDURE — 93005 ELECTROCARDIOGRAM TRACING: CPT

## 2025-05-12 PROCEDURE — 82565 ASSAY OF CREATININE: CPT | Performed by: EMERGENCY MEDICINE

## 2025-05-12 PROCEDURE — 99285 EMERGENCY DEPT VISIT HI MDM: CPT | Mod: 25

## 2025-05-12 PROCEDURE — 36415 COLL VENOUS BLD VENIPUNCTURE: CPT | Performed by: EMERGENCY MEDICINE

## 2025-05-12 PROCEDURE — 96360 HYDRATION IV INFUSION INIT: CPT

## 2025-05-12 PROCEDURE — 84484 ASSAY OF TROPONIN QUANT: CPT | Performed by: EMERGENCY MEDICINE

## 2025-05-12 PROCEDURE — 258N000003 HC RX IP 258 OP 636: Performed by: EMERGENCY MEDICINE

## 2025-05-12 RX ADMIN — SODIUM CHLORIDE, SODIUM LACTATE, POTASSIUM CHLORIDE, AND CALCIUM CHLORIDE 1000 ML: .6; .31; .03; .02 INJECTION, SOLUTION INTRAVENOUS at 23:16

## 2025-05-12 ASSESSMENT — ACTIVITIES OF DAILY LIVING (ADL)
ADLS_ACUITY_SCORE: 41

## 2025-05-12 ASSESSMENT — COLUMBIA-SUICIDE SEVERITY RATING SCALE - C-SSRS
6. HAVE YOU EVER DONE ANYTHING, STARTED TO DO ANYTHING, OR PREPARED TO DO ANYTHING TO END YOUR LIFE?: NO
2. HAVE YOU ACTUALLY HAD ANY THOUGHTS OF KILLING YOURSELF IN THE PAST MONTH?: NO
1. IN THE PAST MONTH, HAVE YOU WISHED YOU WERE DEAD OR WISHED YOU COULD GO TO SLEEP AND NOT WAKE UP?: NO

## 2025-05-13 VITALS
TEMPERATURE: 98 F | SYSTOLIC BLOOD PRESSURE: 143 MMHG | OXYGEN SATURATION: 100 % | HEART RATE: 64 BPM | DIASTOLIC BLOOD PRESSURE: 80 MMHG | RESPIRATION RATE: 16 BRPM

## 2025-05-13 LAB
ATRIAL RATE - MUSE: 67 BPM
DIASTOLIC BLOOD PRESSURE - MUSE: NORMAL MMHG
INTERPRETATION ECG - MUSE: NORMAL
P AXIS - MUSE: 50 DEGREES
PR INTERVAL - MUSE: 146 MS
QRS DURATION - MUSE: 120 MS
QT - MUSE: 432 MS
QTC - MUSE: 456 MS
R AXIS - MUSE: -23 DEGREES
SYSTOLIC BLOOD PRESSURE - MUSE: NORMAL MMHG
T AXIS - MUSE: 7 DEGREES
VENTRICULAR RATE- MUSE: 67 BPM

## 2025-05-13 ASSESSMENT — ACTIVITIES OF DAILY LIVING (ADL): ADLS_ACUITY_SCORE: 41

## 2025-05-13 NOTE — ED TRIAGE NOTES
Patient arrives with intermittent nausea and dizziness that started this morning. He was seen at urgent care and told to come here. Has not had this happen before.      Triage Assessment (Adult)       Row Name 05/12/25 2033          Triage Assessment    Airway WDL WDL        Respiratory WDL    Respiratory WDL WDL        Skin Circulation/Temperature WDL    Skin Circulation/Temperature WDL WDL        Cardiac WDL    Cardiac WDL WDL        Peripheral/Neurovascular WDL    Peripheral Neurovascular WDL WDL        Cognitive/Neuro/Behavioral WDL    Cognitive/Neuro/Behavioral WDL X  intermittent dizziness     Level of Consciousness alert     Arousal Level opens eyes spontaneously     Orientation oriented x 4     Speech spontaneous;clear;logical        Pupils (CN II)    Pupil PERRLA yes     Pupil Size Left 5 mm     Pupil Size Right 5 mm        [place] Coma Scale    Best Eye Response 4-->(E4) spontaneous     Best Motor Response 6-->(M6) obeys commands     Best Verbal Response 5-->(V5) oriented     [place] Coma Scale Score 15

## 2025-05-13 NOTE — ED PROVIDER NOTES
Emergency Department Note      History of Present Illness     Chief Complaint   Nausea and dizziness    OSMAN Zhao is a 44 year old male with history significant for hyperlipidemia who presents for evaluation of nausea and dizziness. Patient arrives from home. Patient reports having intermittent nausea and dizziness starting this morning at 0900 (05/13/25) lasting for around 30 minutes. This evening at around 1700 the nausea and dizziness returned. Nausea and dizziness resolved prior to arrival. He was seen at  and had an EKG done. He notes having a lot of work related stress last week. No vomiting, chest pain, abdominal pain, or shortness of breath. Denies family history of MI. Currently taking allergy medication.     Independent Historian   Wife as detailed above.    Review of External Notes    note 5/12/25 for lightheadedness     Past Medical History     Medical History and Problem List   Hyperlipidemia  Hypertriglyceridemia  Tendinitis of left pectoralis major    Medications   Cetirizine      Physical Exam     Patient Vitals for the past 24 hrs:   BP Temp Temp src Pulse Resp SpO2   05/13/25 0155 (!) 143/80 -- -- 64 16 100 %   05/12/25 2345 (!) 142/77 -- -- 68 17 98 %   05/12/25 2310 (!) 144/85 -- -- 73 13 97 %   05/12/25 2028 (!) 181/79 98  F (36.7  C) Temporal 71 18 100 %     Physical Exam  General: Does not appear in acute distress  Head: No signs of trauma.   CV: Normal rate and regular rhythm.    Resp: Effort normal and breath sounds normal. No respiratory distress.   GI: Soft. There is no tenderness.  No rebound or guarding.  Normal bowel sounds.  No CVA tenderness.  MSK: Normal range of motion. no edema. No Calf tenderness.  Neuro: The patient is alert and oriented. Speech normal.  Skin: Skin is warm and dry. No rash noted.   Psych: normal mood and affect. behavior is normal.       Diagnostics     Lab Results   Labs Ordered and Resulted from Time of ED Arrival to Time of ED  Departure   COMPREHENSIVE METABOLIC PANEL - Abnormal       Result Value    Sodium 136      Potassium 3.8      Carbon Dioxide (CO2) 24      Anion Gap 13      Urea Nitrogen 19.0      Creatinine 1.48 (*)     GFR Estimate 59 (*)     Calcium 9.4      Chloride 99      Glucose 111 (*)     Alkaline Phosphatase 104      AST 35      ALT 49      Protein Total 7.4      Albumin 4.5      Bilirubin Total 0.4     TROPONIN T, HIGH SENSITIVITY - Normal    Troponin T, High Sensitivity <6     CBC WITH PLATELETS AND DIFFERENTIAL    WBC Count 8.1      RBC Count 5.36      Hemoglobin 15.6      Hematocrit 44.8      MCV 84      MCH 29.1      MCHC 34.8      RDW 12.6      Platelet Count 230      % Neutrophils 50      % Lymphocytes 41      % Monocytes 7      % Eosinophils 2      % Basophils 0      % Immature Granulocytes 0      NRBCs per 100 WBC 0      Absolute Neutrophils 4.0      Absolute Lymphocytes 3.3      Absolute Monocytes 0.6      Absolute Eosinophils 0.1      Absolute Basophils 0.0      Absolute Immature Granulocytes 0.0      Absolute NRBCs 0.0         Imaging   No orders to display       EKG   ECG results from 05/12/25   EKG 12 lead     Value    Systolic Blood Pressure     Diastolic Blood Pressure     Ventricular Rate 73    Atrial Rate 73    DE Interval 142    QRS Duration 132        QTc 456    P Axis 37    R AXIS -27    T Axis 6    Interpretation ECG      Sinus rhythm  Right bundle branch block  Minimal voltage criteria for LVH, may be normal variant ( R in aVL )  T wave abnormality, consider lateral ischemia  Abnormal ECG  I reviewed at 2033.       ECG taken at 0022, ECG read at 0026  Repeat ECG.  Normal sinus rhythm  Nonspecific intraventricular conduction delay  Nonspecific T wave abnormality   Rate 67 bpm. DE interval 146 ms. QRS duration 120 ms. QT/QTc 432/456 ms. P-R-T axes 50 -23 7.       ED Course      Medications Administered   Medications   lactated ringers BOLUS 1,000 mL (0 mLs Intravenous Stopped 5/13/25 0007)        Procedures   Procedures     Discussion of Management   None    ED Course   ED Course as of 05/13/25 0558   e May 13, 2025   0006 I obtained history and examined the patient as noted above.    0147 I rechecked and updated the patient.    0151 We discussed plan for discharge and the patient is comfortable with this.        Additional Documentation  None    Medical Decision Making / Diagnosis     CMS Diagnoses: None    MIPS            None    MDM   Cesar Zhao is a 44 year old male presents due to a couple episodes of feeling lightheaded today.  He reports he is currently symptom-free.  He had gone to the urgent care who obtained an EKG and noted some flipped T waves in the lateral leads and directed him to the ER.  This was again noted on his EKG but there is no other concerning ST segment changes and no arrhythmia.  Blood work was obtained that was reassuring including an undetectable troponin.  Patient reports that he plays soccer frequently including on Saturday and runs quite a bit with no symptoms.  Clinically I have low suspicion for ACS as he is not having any chest pain.  His heart score is 2.  Patient was discharged home referral for close follow-up with cardiology for recheck and recommended close follow-up with his primary care doctor.  He was given return precautions    Disposition   The patient was discharged.     Diagnosis     ICD-10-CM    1. Lightheadedness  R42 Adult Cardiology Evaluation  Referral           Discharge Medications   Discharge Medication List as of 5/13/2025  1:50 AM        Scribe Disclosure:  I, Ramesh Chun, am serving as a scribe at 12:18 AM on 5/13/2025 to document services personally performed by Dwayne Schmidt MD based on my observations and the provider's statements to me.        Dwayne Schmidt MD  05/13/25 [id number]

## 2025-05-14 ENCOUNTER — OFFICE VISIT (OUTPATIENT)
Dept: CARDIOLOGY | Facility: CLINIC | Age: 45
End: 2025-05-14
Attending: EMERGENCY MEDICINE
Payer: COMMERCIAL

## 2025-05-14 VITALS
DIASTOLIC BLOOD PRESSURE: 76 MMHG | HEIGHT: 67 IN | HEART RATE: 51 BPM | SYSTOLIC BLOOD PRESSURE: 130 MMHG | OXYGEN SATURATION: 97 % | WEIGHT: 213.9 LBS | BODY MASS INDEX: 33.57 KG/M2

## 2025-05-14 DIAGNOSIS — R94.31 NONSPECIFIC ABNORMAL ELECTROCARDIOGRAM (ECG) (EKG): ICD-10-CM

## 2025-05-14 DIAGNOSIS — N18.2 CKD (CHRONIC KIDNEY DISEASE) STAGE 2, GFR 60-89 ML/MIN: Primary | ICD-10-CM

## 2025-05-14 DIAGNOSIS — R42 LIGHTHEADEDNESS: ICD-10-CM

## 2025-05-14 NOTE — PROGRESS NOTES
Cardiology Consultation      Cesar Zhao MRN# [id number]   YOB: 1980 Age: 44 year old   Date of Visit 05/14/2025     Reason for consult: Lightheadedness           Assessment and Plan:     Lightheadedness and nonspecific abnormal EKG with QTc 456 ms  Plan stress echocardiogram to rule out ischemia and arrhythmia  I will review results of testing with the patient via electronic medical record afterwards      Elevated glucose  No recent hemoglobin A1c on file, asked patient to follow-up with his primary care as his resting glucose was elevated despite fasting and if diabetes could be contributing to his renal dysfunction and lightheadedness especially if hypovolemic    45 minutes spent today in review of past medical record, discussion with patient and postvisit charting    This note was transcribed using electronic voice recognition software, typographical errors may be present.    The longitudinal plan of care for the diagnosis(es)/condition(s) as documented were addressed during this visit. Due to the added complexity in care, I will continue to support Cesar in the subsequent management and with ongoing continuity of care.                  Chief Complaint:   New Patient (ED follow-up Lightheadedness)           History of Present Illness:   This patient is a very pleasant 44 year old  male who presented to the urgent care and was sent to the emergency department for evaluation due to lightheadedness and nausea.  He ruled out for myocardial infarction, he had nonspecific ST-T on his EKG.  His creatinine was elevated above his baseline and his glucose was elevated despite fasting for 4 hours prior to the blood draw.    He had been more active and played soccer earlier in the day without any dyspnea on exertion or exertional chest discomfort.  He had a couple beers that was not typical for him.               Physical Exam:     Vitals: /76 (BP Location: Right arm, Patient  "Position: Sitting, Cuff Size: Adult Regular)   Pulse 51   Ht 1.702 m (5' 7\")   Wt 97 kg (213 lb 14.4 oz)   SpO2 97%   BMI 33.50 kg/m    Constitutional:  cooperative, alert and oriented, well developed, well nourished, in no acute distress        Skin:  warm and dry to the touch, no apparent skin lesions or masses noted        Head:  normocephalic, no masses or lesions        Eyes:  pupils equal and round, conjunctivae and lids unremarkable, sclera white, no xanthalasma, EOMS intact, no nystagmus        ENT:  no pallor or cyanosis        Neck:  JVP normal        Chest:  normal symmetry        Cardiac: regular rhythm, normal S1 and S2, no murmurs, gallops or rubs detected                  Abdomen:  BS normoactive        Vascular:                                        Extremities and Back:  no deformities, clubbing, cyanosis, erythema observed, no edema        Neurological:  no gross motor deficits, affect appropriate                      Past Medical History:   I have reviewed this patient's past medical history  Past Medical History:   Diagnosis Date    Hyperlipidemia LDL goal < 130     elevated triglycerides    Hypertriglyceridemia              Past Surgical History:   I have reviewed this patient's past surgical history  History reviewed. No pertinent surgical history.            Social History:   I have reviewed this patient's social history  Social History     Tobacco Use    Smoking status: Former     Current packs/day: 0.10     Types: Cigarettes    Smokeless tobacco: Never   Substance Use Topics    Alcohol use: Yes     Alcohol/week: 0.0 standard drinks of alcohol     Comment: 6x per year             Family History:   I have reviewed this patient's family history  Family History   Problem Relation Age of Onset    Hypertension Mother     Asthma Mother         resolved    Allergies Mother         seasonal allergies             Allergies:     Allergies   Allergen Reactions    No Known Drug Allergy              " Medications:   I have reviewed this patient's current medications  Current Outpatient Medications   Medication Sig Dispense Refill    acetaminophen (TYLENOL) 500 MG tablet Take 1,000 mg by mouth every 6 hours as needed for mild pain      cetirizine (ZYRTEC) 10 MG tablet TAKE ONE TABLET BY MOUTH EVERY EVENING DURING ALLERGY SEASON 30 tablet 0    fluticasone (FLONASE) 50 MCG/ACT nasal spray Spray 1 spray into both nostrils daily 16 g 0    fluticasone (FLONASE) 50 MCG/ACT spray Spray 2 sprays into both nostrils daily 16 g 11    amoxicillin (AMOXIL) 875 MG tablet Take 1 tablet (875 mg) by mouth 2 times daily 20 tablet 0    clindamycin (CLEOCIN T) 1 % external solution MASSAGE INTO THE AFFECTED AREAS OF FOLLICULITIS ON SCALP TWICE DAILY      ibuprofen (ADVIL/MOTRIN) 800 MG tablet Take 1 tablet (800 mg) by mouth every 8 hours as needed for moderate pain 30 tablet 1               Review of Systems:       Review of Systems:  Skin:        Eyes:       ENT:       Respiratory:       Cardiovascular:       Gastroenterology:      Genitourinary:       Musculoskeletal:       Neurologic:       Psychiatric:       Heme/Lymph/Imm:       Endocrine:                          Data:   All available laboratory data reviewed  Lab Results   Component Value Date    CHOL 179 03/24/2020     Lab Results   Component Value Date    HDL 32 03/24/2020     Lab Results   Component Value Date    .00 03/24/2020     Lab Results   Component Value Date    TRIG 179 03/24/2020     Lab Results   Component Value Date    CHOLHDLRATIO 4.7 03/16/2015     TSH   Date Value Ref Range Status   05/29/2013 2.26 0.4 - 5.0 mU/L Final     Last Basic Metabolic Panel:  Lab Results   Component Value Date     05/12/2025     03/17/2019      Lab Results   Component Value Date    POTASSIUM 3.8 05/12/2025    POTASSIUM 4.1 01/22/2022    POTASSIUM 3.7 03/17/2019     Lab Results   Component Value Date    CHLORIDE 99 05/12/2025    CHLORIDE 106 01/22/2022    CHLORIDE  105 03/17/2019     Lab Results   Component Value Date    PRINCESS 9.4 05/12/2025    PRINCESS 9.3 03/17/2019     Lab Results   Component Value Date    CO2 24 05/12/2025    CO2 28 01/22/2022    CO2 28 03/17/2019     Lab Results   Component Value Date    BUN 19.0 05/12/2025    BUN 15 01/22/2022    BUN 17 03/17/2019     Lab Results   Component Value Date    CR 1.48 05/12/2025    CR 1.27 03/17/2019     Lab Results   Component Value Date     05/12/2025     01/22/2022    GLC 93 03/24/2020     Lab Results   Component Value Date    WBC 8.1 05/12/2025    WBC 7.7 09/05/2019     Lab Results   Component Value Date    RBC 5.36 05/12/2025    RBC 5.21 09/05/2019     Lab Results   Component Value Date    HGB 15.6 05/12/2025    HGB 15.2 09/05/2019     Lab Results   Component Value Date    HCT 44.8 05/12/2025    HCT 43.2 09/05/2019     Lab Results   Component Value Date    MCV 84 05/12/2025    MCV 83 09/05/2019     Lab Results   Component Value Date    MCH 29.1 05/12/2025    MCH 29.2 09/05/2019     Lab Results   Component Value Date    MCHC 34.8 05/12/2025    MCHC 35.2 09/05/2019     Lab Results   Component Value Date    RDW 12.6 05/12/2025    RDW 13.1 09/05/2019     Lab Results   Component Value Date     05/12/2025     09/05/2019

## 2025-05-14 NOTE — LETTER
5/14/2025    Blake Jara MD  [address]  [facility] [address]    RE: Cesar Zhao       Dear Colleague,     I had the pleasure of seeing Cesar Zhao in the [facility].  Cardiology Consultation      Cesar Zhao MRN# [id number]   YOB: 1980 Age: 44 year old   Date of Visit 05/14/2025     Reason for consult: Lightheadedness           Assessment and Plan:     Lightheadedness and nonspecific abnormal EKG with QTc 456 ms  Plan stress echocardiogram to rule out ischemia and arrhythmia  I will review results of testing with the patient via electronic medical record afterwards      Elevated glucose  No recent hemoglobin A1c on file, asked patient to follow-up with his primary care as his resting glucose was elevated despite fasting and if diabetes could be contributing to his renal dysfunction and lightheadedness especially if hypovolemic    45 minutes spent today in review of past medical record, discussion with patient and postvisit charting    This note was transcribed using electronic voice recognition software, typographical errors may be present.    The longitudinal plan of care for the diagnosis(es)/condition(s) as documented were addressed during this visit. Due to the added complexity in care, I will continue to support Cesar in the subsequent management and with ongoing continuity of care.                  Chief Complaint:   New Patient (ED follow-up Lightheadedness)           History of Present Illness:   This patient is a very pleasant 44 year old  male who presented to the urgent care and was sent to the emergency department for evaluation due to lightheadedness and nausea.  He ruled out for myocardial infarction, he had nonspecific ST-T on his EKG.  His creatinine was elevated above his baseline and his glucose was elevated despite fasting for 4 hours prior to the blood draw.    He had been more active and  "played soccer earlier in the day without any dyspnea on exertion or exertional chest discomfort.  He had a couple beers that was not typical for him.               Physical Exam:     Vitals: /76 (BP Location: Right arm, Patient Position: Sitting, Cuff Size: Adult Regular)   Pulse 51   Ht 1.702 m (5' 7\")   Wt 97 kg (213 lb 14.4 oz)   SpO2 97%   BMI 33.50 kg/m    Constitutional:  cooperative, alert and oriented, well developed, well nourished, in no acute distress        Skin:  warm and dry to the touch, no apparent skin lesions or masses noted        Head:  normocephalic, no masses or lesions        Eyes:  pupils equal and round, conjunctivae and lids unremarkable, sclera white, no xanthalasma, EOMS intact, no nystagmus        ENT:  no pallor or cyanosis        Neck:  JVP normal        Chest:  normal symmetry        Cardiac: regular rhythm, normal S1 and S2, no murmurs, gallops or rubs detected                  Abdomen:  BS normoactive        Vascular:                                        Extremities and Back:  no deformities, clubbing, cyanosis, erythema observed, no edema        Neurological:  no gross motor deficits, affect appropriate                      Past Medical History:   I have reviewed this patient's past medical history  Past Medical History:   Diagnosis Date     Hyperlipidemia LDL goal < 130     elevated triglycerides     Hypertriglyceridemia              Past Surgical History:   I have reviewed this patient's past surgical history  History reviewed. No pertinent surgical history.            Social History:   I have reviewed this patient's social history  Social History     Tobacco Use     Smoking status: Former     Current packs/day: 0.10     Types: Cigarettes     Smokeless tobacco: Never   Substance Use Topics     Alcohol use: Yes     Alcohol/week: 0.0 standard drinks of alcohol     Comment: 6x per year             Family History:   I have reviewed this patient's family history  Family " History   Problem Relation Age of Onset     Hypertension Mother      Asthma Mother         resolved     Allergies Mother         seasonal allergies             Allergies:     Allergies   Allergen Reactions     No Known Drug Allergy              Medications:   I have reviewed this patient's current medications  Current Outpatient Medications   Medication Sig Dispense Refill     acetaminophen (TYLENOL) 500 MG tablet Take 1,000 mg by mouth every 6 hours as needed for mild pain       cetirizine (ZYRTEC) 10 MG tablet TAKE ONE TABLET BY MOUTH EVERY EVENING DURING ALLERGY SEASON 30 tablet 0     fluticasone (FLONASE) 50 MCG/ACT nasal spray Spray 1 spray into both nostrils daily 16 g 0     fluticasone (FLONASE) 50 MCG/ACT spray Spray 2 sprays into both nostrils daily 16 g 11     amoxicillin (AMOXIL) 875 MG tablet Take 1 tablet (875 mg) by mouth 2 times daily 20 tablet 0     clindamycin (CLEOCIN T) 1 % external solution MASSAGE INTO THE AFFECTED AREAS OF FOLLICULITIS ON SCALP TWICE DAILY       ibuprofen (ADVIL/MOTRIN) 800 MG tablet Take 1 tablet (800 mg) by mouth every 8 hours as needed for moderate pain 30 tablet 1               Review of Systems:       Review of Systems:  Skin:        Eyes:       ENT:       Respiratory:       Cardiovascular:       Gastroenterology:      Genitourinary:       Musculoskeletal:       Neurologic:       Psychiatric:       Heme/Lymph/Imm:       Endocrine:                          Data:   All available laboratory data reviewed  Lab Results   Component Value Date    CHOL 179 03/24/2020     Lab Results   Component Value Date    HDL 32 03/24/2020     Lab Results   Component Value Date    .00 03/24/2020     Lab Results   Component Value Date    TRIG 179 03/24/2020     Lab Results   Component Value Date    CHOLHDLRATIO 4.7 03/16/2015     TSH   Date Value Ref Range Status   05/29/2013 2.26 0.4 - 5.0 mU/L Final     Last Basic Metabolic Panel:  Lab Results   Component Value Date     05/12/2025      03/17/2019      Lab Results   Component Value Date    POTASSIUM 3.8 05/12/2025    POTASSIUM 4.1 01/22/2022    POTASSIUM 3.7 03/17/2019     Lab Results   Component Value Date    CHLORIDE 99 05/12/2025    CHLORIDE 106 01/22/2022    CHLORIDE 105 03/17/2019     Lab Results   Component Value Date    PRINCESS 9.4 05/12/2025    PRINCESS 9.3 03/17/2019     Lab Results   Component Value Date    CO2 24 05/12/2025    CO2 28 01/22/2022    CO2 28 03/17/2019     Lab Results   Component Value Date    BUN 19.0 05/12/2025    BUN 15 01/22/2022    BUN 17 03/17/2019     Lab Results   Component Value Date    CR 1.48 05/12/2025    CR 1.27 03/17/2019     Lab Results   Component Value Date     05/12/2025     01/22/2022    GLC 93 03/24/2020     Lab Results   Component Value Date    WBC 8.1 05/12/2025    WBC 7.7 09/05/2019     Lab Results   Component Value Date    RBC 5.36 05/12/2025    RBC 5.21 09/05/2019     Lab Results   Component Value Date    HGB 15.6 05/12/2025    HGB 15.2 09/05/2019     Lab Results   Component Value Date    HCT 44.8 05/12/2025    HCT 43.2 09/05/2019     Lab Results   Component Value Date    MCV 84 05/12/2025    MCV 83 09/05/2019     Lab Results   Component Value Date    MCH 29.1 05/12/2025    MCH 29.2 09/05/2019     Lab Results   Component Value Date    MCHC 34.8 05/12/2025    MCHC 35.2 09/05/2019     Lab Results   Component Value Date    RDW 12.6 05/12/2025    RDW 13.1 09/05/2019     Lab Results   Component Value Date     05/12/2025     09/05/2019       Thank you for allowing me to participate in the care of your patient.      Sincerely,     Leobardo Deleon MD     [facility]  cc:   Dwayne Schmidt MD  [facility]  [address] [place] [address]

## 2025-05-28 ENCOUNTER — HOSPITAL ENCOUNTER (OUTPATIENT)
Dept: CARDIOLOGY | Facility: CLINIC | Age: 45
Discharge: HOME | End: 2025-05-28
Attending: INTERNAL MEDICINE
Payer: COMMERCIAL

## 2025-05-28 DIAGNOSIS — R42 LIGHTHEADEDNESS: ICD-10-CM

## 2025-05-28 DIAGNOSIS — N18.2 CKD (CHRONIC KIDNEY DISEASE) STAGE 2, GFR 60-89 ML/MIN: ICD-10-CM

## 2025-05-28 DIAGNOSIS — R94.31 NONSPECIFIC ABNORMAL ELECTROCARDIOGRAM (ECG) (EKG): ICD-10-CM

## 2025-05-28 PROCEDURE — 93321 DOPPLER ECHO F-UP/LMTD STD: CPT | Mod: TC

## 2025-06-03 ENCOUNTER — RESULTS FOLLOW-UP (OUTPATIENT)
Dept: CARDIOLOGY | Facility: CLINIC | Age: 45
End: 2025-06-03

## 2025-07-12 ENCOUNTER — HEALTH MAINTENANCE LETTER (OUTPATIENT)
Age: 45
End: 2025-07-12